# Patient Record
Sex: FEMALE | Race: BLACK OR AFRICAN AMERICAN | Employment: PART TIME | URBAN - METROPOLITAN AREA
[De-identification: names, ages, dates, MRNs, and addresses within clinical notes are randomized per-mention and may not be internally consistent; named-entity substitution may affect disease eponyms.]

---

## 2017-04-14 ENCOUNTER — TELEPHONE (OUTPATIENT)
Dept: FAMILY MEDICINE CLINIC | Facility: CLINIC | Age: 47
End: 2017-04-14

## 2017-04-14 NOTE — TELEPHONE ENCOUNTER
Attempted to contact patient Maxime Suarez regarding scheduling an appointment for continued care. Contact number on file is not working.

## 2017-08-03 ENCOUNTER — OFFICE VISIT (OUTPATIENT)
Dept: FAMILY MEDICINE CLINIC | Facility: CLINIC | Age: 47
End: 2017-08-03

## 2017-08-03 VITALS
WEIGHT: 265 LBS | HEART RATE: 77 BPM | DIASTOLIC BLOOD PRESSURE: 110 MMHG | TEMPERATURE: 98.2 F | SYSTOLIC BLOOD PRESSURE: 158 MMHG | OXYGEN SATURATION: 98 % | BODY MASS INDEX: 45.24 KG/M2 | HEIGHT: 64 IN

## 2017-08-03 DIAGNOSIS — R39.15 URINARY URGENCY: ICD-10-CM

## 2017-08-03 DIAGNOSIS — D86.9 SARCOIDOSIS: ICD-10-CM

## 2017-08-03 DIAGNOSIS — Z12.39 BREAST CANCER SCREENING: ICD-10-CM

## 2017-08-03 DIAGNOSIS — I10 HTN (HYPERTENSION), BENIGN: ICD-10-CM

## 2017-08-03 DIAGNOSIS — R82.90 ABNORMAL URINE ODOR: ICD-10-CM

## 2017-08-03 DIAGNOSIS — Z00.00 ROUTINE GENERAL MEDICAL EXAMINATION AT A HEALTH CARE FACILITY: Primary | ICD-10-CM

## 2017-08-03 DIAGNOSIS — E78.00 PURE HYPERCHOLESTEROLEMIA: ICD-10-CM

## 2017-08-03 DIAGNOSIS — E13.9 SECONDARY DIABETES (HCC): ICD-10-CM

## 2017-08-03 LAB
BILIRUB UR QL STRIP: NEGATIVE
GLUCOSE UR-MCNC: NEGATIVE MG/DL
KETONES P FAST UR STRIP-MCNC: NEGATIVE MG/DL
PH UR STRIP: 7 [PH] (ref 4.6–8)
PROT UR QL STRIP: NEGATIVE MG/DL
SP GR UR STRIP: 1.01 (ref 1–1.03)
UA UROBILINOGEN AMB POC: NORMAL (ref 0.2–1)
URINALYSIS CLARITY POC: CLEAR
URINALYSIS COLOR POC: YELLOW
URINE BLOOD POC: NEGATIVE
URINE LEUKOCYTES POC: NORMAL
URINE NITRITES POC: NEGATIVE

## 2017-08-03 RX ORDER — ONDANSETRON 4 MG/1
4 TABLET, ORALLY DISINTEGRATING ORAL
Qty: 90 TAB | Refills: 0 | Status: SHIPPED | OUTPATIENT
Start: 2017-08-03 | End: 2018-01-22 | Stop reason: SDUPTHER

## 2017-08-03 RX ORDER — VALSARTAN AND HYDROCHLOROTHIAZIDE 320; 12.5 MG/1; MG/1
1 TABLET, FILM COATED ORAL DAILY
Qty: 90 TAB | Refills: 1 | Status: SHIPPED | OUTPATIENT
Start: 2017-08-03 | End: 2019-10-24

## 2017-08-03 RX ORDER — PRAVASTATIN SODIUM 20 MG/1
20 TABLET ORAL
Qty: 90 TAB | Refills: 1 | Status: CANCELLED | OUTPATIENT
Start: 2017-08-03

## 2017-08-03 RX ORDER — VALSARTAN AND HYDROCHLOROTHIAZIDE 160; 12.5 MG/1; MG/1
1 TABLET, FILM COATED ORAL DAILY
Qty: 90 TAB | Refills: 1 | Status: CANCELLED | OUTPATIENT
Start: 2017-08-03

## 2017-08-03 NOTE — MR AVS SNAPSHOT
Visit Information Date & Time Provider Department Dept. Phone Encounter #  
 8/3/2017  9:30 AM Jesse Lopez [phone] [id number] Follow-up Instructions Return in about 4 weeks (around 8/31/2017) for routine care with Dr. Tim Schuster, for bp check, for med check. Your Appointments 8/3/2017  9:30 AM  
PHYSICAL with FRANDY Lopez 47 ([facility] Appt Note: CPE  syb 06/16; CPE syb 06/16  
 [address] [place] [address] [phone]  
[address] [address] Upcoming Health Maintenance Date Due HEMOGLOBIN A1C Q6M 2/4/2017 FOOT EXAM Q1 2/16/2017 MICROALBUMIN Q1 2/16/2017 INFLUENZA AGE 9 TO ADULT 8/1/2017 LIPID PANEL Q1 8/4/2017 EYE EXAM RETINAL OR DILATED Q1 10/2/2017 PAP AKA CERVICAL CYTOLOGY 12/8/2018 DTaP/Tdap/Td series (2 - Td) 5/18/2026 Allergies as of 8/3/2017  Review Complete On: 8/3/2017 By: Sinan Laura LPN Severity Noted Reaction Type Reactions Pork/porcine Containing Products  02/16/2016    Hives Current Immunizations  Reviewed on 12/31/2014 Name Date Influenza Vaccine 10/8/2013 Influenza Vaccine (Quad) PF 8/25/2015  8:18 AM  
 TB Skin Test (PPD) Intradermal 2/19/2016  4:00 PM, 12/31/2014 Tetanus Toxoid, Adsorbed 4/19/2012 Not reviewed this visit You Were Diagnosed With   
  
 Codes Comments Routine general medical examination at a health care facility    -  Primary ICD-10-CM: Z00.00 ICD-9-CM: V70.0   
 HTN (hypertension), benign     ICD-10-CM: I10 
ICD-9-CM: 401.1 Pure hypercholesterolemia     ICD-10-CM: E78.00 ICD-9-CM: 272.0 Secondary diabetes ([address]     ICD-10-CM: E13.9 ICD-9-CM: 249.00 Breast cancer screening     ICD-10-CM: Z12.39 
ICD-9-CM: V76.10 Vitals BP Pulse Temp Height(growth percentile) Weight(growth percentile) SpO2 (!) 160/110 (BP 1 Location: Left arm, BP Patient Position: Sitting) 77 98.2 °F (36.8 °C) (Oral) 5' 4\" (1.626 m) 265 lb (120.2 kg) 98% BMI OB Status Smoking Status 45.49 kg/m2 Hysterectomy Former Smoker BMI and BSA Data Body Mass Index Body Surface Area 45.49 kg/m 2 2.33 m 2 Preferred Pharmacy Pharmacy Name Phone Lisa [address] [place] [address] Giulia Starr Your Updated Medication List  
  
   
This list is accurate as of: 8/3/17  9:11 AM.  Always use your most recent med list. amLODIPine 10 mg tablet Commonly known as:  Ember Raddle Take 1 tablet by mouth daily. aspirin delayed-release 81 mg tablet Take  by mouth daily. azaTHIOprine 50 mg tablet Commonly known as:  [employer] Take 50 mg by mouth daily. 3 tab daily CALCIUM 600 + D 600-125 mg-unit Tab Generic drug:  calcium-cholecalciferol (d3) Take  by mouth.  
  
 etodolac 400 mg tablet Commonly known as:  LODINE Take  by mouth two (2) times daily (with meals). ferrous sulfate 325 mg (65 mg iron) tablet Take by Mouth Once a Day. IMITREX 100 mg tablet Generic drug:  SUMAtriptan Take 100 mg by mouth once as needed for Migraine. JANUVIA 100 mg tablet Generic drug:  SITagliptin Take 100 mg by mouth daily. ondansetron 4 mg disintegrating tablet Commonly known as:  ZOFRAN ODT Take 1 Tab by mouth every eight (8) hours as needed for Nausea. [address] Take  by mouth. PEPCID 20 mg tablet Generic drug:  famotidine Take 20 mg by mouth two (2) times a day. pneumococcal 23-valent 25 mcg/0.5 mL injection Commonly known as:  PNEUMOVAX 23  
0.5 mL by IntraMUSCular route once for 1 dose. pravastatin 20 mg tablet Commonly known as:  PRAVACHOL Take 1 Tab by mouth nightly. predniSONE 5 mg tablet Commonly known as:  Rowan Look  
 Take  by mouth. PULMICORT FLEXHALER 180 mcg/actuation Aepb inhaler Generic drug:  budesonide Take  by inhalation. solifenacin 10 mg tablet Commonly known as:  VESIcare Take 1 Tab by mouth daily. topiramate 50 mg tablet Commonly known as:  TOPAMAX Take 50 mg by mouth two (2) times a day. 100 mg at hs  
  
 valsartan-hydroCHLOROthiazide 320-12.5 mg per tablet Commonly known as:  DIOVAN-HCT Take 1 Tab by mouth daily. Prescriptions Printed Refills  
 pneumococcal 23-valent (PNEUMOVAX 23) 25 mcg/0.5 mL injection 0 Si.5 mL by IntraMUSCular route once for 1 dose. Class: Print Route: IntraMUSCular Prescriptions Sent to Pharmacy Refills  
 ondansetron (ZOFRAN ODT) 4 mg disintegrating tablet 0 Sig: Take 1 Tab by mouth every eight (8) hours as needed for Nausea. Class: Normal  
 Pharmacy: [facility] [address] [place] [address] Ph #: [phone] Route: Oral  
 valsartan-hydroCHLOROthiazide (DIOVAN-HCT) 320-12.5 mg per tablet 1 Sig: Take 1 Tab by mouth daily. Class: Normal  
 Pharmacy: [facility] [address] [place] [address] Ph #: [phone] Route: Oral  
  
Follow-up Instructions Return in about 4 weeks (around 2017) for routine care with Dr. Gallito Burns, for bp check, for med check. To-Do List   
 2017 Lab:  MICROALBUMIN, UR, RAND W/ MICROALBUMIN/CREA RATIO   
  
 2017 Lab:  CBC WITH AUTOMATED DIFF   
  
 2017 Lab:  HEMOGLOBIN A1C WITH EAG   
  
 2017 Lab:  LIPID PANEL   
  
 2017 Imaging:  ABIGAIL MAMMO BI SCREENING INCL CAD   
  
 2017 Lab:  METABOLIC PANEL, COMPREHENSIVE   
  
 2017 Lab:  T4, FREE   
  
 2017 Lab:  TSH 3RD GENERATION   
  
 2017 Lab:  VITAMIN D, 25 HYDROXY Introducing [facility]! Abel James introduces [employer] patient portal. Now you can access parts of your medical record, email your doctor's office, and request medication refills online. 1. In your internet browser, go to [email or web address] 2. Click on the First Time User? Click Here link in the Sign In box. You will see the New Member Sign Up page. 3. Enter your [employer] Access Code exactly as it appears below. You will not need to use this code after youve completed the sign-up process. If you do not sign up before the expiration date, you must request a new code. · [employer] Access Code: [email or web address] Expires: 11/1/2017  8:34 AM 
 
4. Enter the last four digits of your Social Security Number (xxxx) and Date of Birth (mm/dd/yyyy) as indicated and click Submit. You will be taken to the next sign-up page. 5. Create a [employer] ID. This will be your [employer] login ID and cannot be changed, so think of one that is secure and easy to remember. 6. Create a [employer] password. You can change your password at any time. 7. Enter your Password Reset Question and Answer. This can be used at a later time if you forget your password. 8. Enter your e-mail address. You will receive e-mail notification when new information is available in [address] 9. Click Sign Up. You can now view and download portions of your medical record. 10. Click the Download Summary menu link to download a portable copy of your medical information. If you have questions, please visit the Frequently Asked Questions section of the [employer] website. Remember, [employer] is NOT to be used for urgent needs. For medical emergencies, dial 911. Now available from your iPhone and Android! Please provide this summary of care documentation to your next provider. If you have any questions after today's visit, please call [phone].

## 2017-08-03 NOTE — PROGRESS NOTES
History and Physical    Patient: Raymon Pena MRN: [id number]  SSN: [id number]    YOB: 1970  Age: 55 y.o. Sex: female      Subjective:      Raymon Pena is a 55 y.o. female who presents today for an annual physical and follow up HTN, DM, sarcoidosis etc  Patient moved to [place] but moved back into the area in February. In terms of her HTN, she is on Norvasc and Diovan-HCT. BP elevated today, patient states took her meds today. In terms of her DM, she is on Januvia. She has an endocrinologist.  Sees on an annual basis. Is trying to get another appointment. In terms of her sarcoidosis, she is on Prednisone, pulmicort and azathioprine. She has a pulmonologist but she has not them in over 1 year, is trying to get another appointment. She will have occasional CP and SOB that is not associated with activity and is aleviated when she uses her inhaler. Patient went to the ED several months ago for CP and SOB, she stated she was off all of her medication at that time. Stress testing done was normal.     Patient has several year c/o urinary urgency and foul urine odor, she states that is partly why she is on vesicare. She has chronic, idiopathic nausea, requesting refill of zofran. She states her doctors think it may stem from her sarcoidosis.        Last Mammogram:  Been several years- ordered today  Last PAP:  Normal 7/2011- s/pt hyst- cervix removed      PMH:  Past Medical History:   Diagnosis Date    Acute sinusitis 12/8/2015    Anemia     Chronic pain     Diabetes ([address]     Headache     HTN (hypertension) 8/25/2015    HTN (hypertension), benign 8/25/2015    Hypertension     Obesity, Class II, BMI 35-39.9 2/16/2016    Obesity, Class III, BMI 40-49.9 (morbid obesity) ([address] 8/25/2015    Pure hypercholesterolemia 8/4/2016    Secondary diabetes ([address] 12/8/2015    Viral pharyngitis 2/19/2016    Vitamin D deficiency 2/16/2016     Past Surgical History:   Procedure Laterality Date    HX HYSTERECTOMY  10/20/2013    HX ORTHOPAEDIC  5/20/2012    left foot fusion        FamHx:  Family History   Problem Relation Age of Onset    Asthma Mother     Heart Disease Father      arhythmia    Stroke Father 48     TIA    Alcohol abuse Father     Heart Disease Paternal Grandmother        SocialHx:  Social History   Substance Use Topics    Smoking status: Former Smoker     Packs/day: 0.50     Years: 10.00     Quit date: 1/1/1986    Smokeless tobacco: Never Used      Comment: 1 pack every week    Alcohol use 8.4 oz/week     14 Cans of beer per week      Comment: OCC        Meds:  Prior to Admission medications    Medication Sig Start Date End Date Taking? Authorizing Provider   ondansetron (ZOFRAN ODT) 4 mg disintegrating tablet Take 1 Tab by mouth every eight (8) hours as needed for Nausea. 8/3/17  Yes Jessica Pumphrey V, PA   pneumococcal 23-valent (PNEUMOVAX 23) 25 mcg/0.5 mL injection 0.5 mL by IntraMUSCular route once for 1 dose. 8/3/17 8/3/17 Yes Jessica Pumphrey V, PA   valsartan-hydroCHLOROthiazide (DIOVAN-HCT) 320-12.5 mg per tablet Take 1 Tab by mouth daily. 8/3/17  Yes Jessica Pumphrey V, PA   ferrous sulfate 325 mg (65 mg iron) tablet Take by Mouth Once a Day. Yes Historical Provider   pravastatin (PRAVACHOL) 20 mg tablet Take 1 Tab by mouth nightly. 8/27/15  Yes Sintia Baird MD   MV,CA,MIN/IRON/FA/GUARANA/CAFF (ONE-A-DAY WOMEN'S ACTIVE PO) Take  by mouth. Yes Historical Provider   calcium-cholecalciferol, d3, (CALCIUM 600 + D) 600-125 mg-unit tab Take  by mouth. Yes Historical Provider   solifenacin (VESICARE) 10 mg tablet Take 1 Tab by mouth daily. 8/25/15  Yes Sintia Baird MD   famotidine (PEPCID) 20 mg tablet Take 20 mg by mouth two (2) times a day. Yes Historical Provider   etodolac (LODINE) 400 mg tablet Take  by mouth two (2) times daily (with meals). Yes Historical Provider   prednisone (DELTASONE) 5 mg tablet Take  by mouth.    Yes Historical Provider   sitagliptin (JANUVIA) 100 mg tablet Take 100 mg by mouth daily. Yes Historical Provider   topiramate (TOPAMAX) 50 mg tablet Take 50 mg by mouth two (2) times a day. 100 mg at hs   Yes Historical Provider   azathioprine (IMURAN) 50 mg tablet Take 50 mg by mouth daily. 3 tab daily   Yes Historical Provider   SUMAtriptan (IMITREX) 100 mg tablet Take 100 mg by mouth once as needed for Migraine. Yes Historical Provider   aspirin delayed-release 81 mg tablet Take  by mouth daily. Yes Historical Provider   budesonide (PULMICORT FLEXHALER) 180 mcg/actuation aepb inhaler Take  by inhalation. Yes Historical Provider   amlodipine (NORVASC) 10 mg tablet Take 1 tablet by mouth daily. 8/25/14  Yes Tj Harrington MD        Allergies: Allergies   Allergen Reactions    Pork/Porcine Containing Products Hives       Review of Systems:  Items in Bold are positive  Constitutional: negative for fevers, chills and malaise  Eyes: negative for visual disturbance  Ears, Nose, Mouth, Throat, and Face: negative for nasal congestion  Respiratory: intermittent SOB , negative for cough   Cardiovascular: intermittent CP, negative for chest pressure/discomfort  Gastrointestinal: negative for nausea, vomiting, melena, diarrhea, constipation and abdominal pain  Genitourinary: urinary urgency and odor, negative for frequency, dysuria or hematuria  Musculoskeletal:negative for myalgias and arthralgias  Neurological: negative for headaches, dizziness and paresthesia    Objective:     Visit Vitals    BP (!) 158/110 (BP 1 Location: Right arm, BP Patient Position: Sitting)    Pulse 77    Temp 98.2 °F (36.8 °C) (Oral)    Ht 5' 4\" (1.626 m)    Wt 265 lb (120.2 kg)    SpO2 98%    BMI 45.49 kg/m2       Physical Exam:  GENERAL: alert, cooperative, no distress, appears stated age  [de-identified]: EYE: conjunctivae/corneas clear. EOM's intact.  EAR: TM's pearly gray bilaterally NOSE: Nasal mucosa pink and moist bilaterally, THROAT: no erythema or edema  THYROID: no thyromegaly  NECK: no adenopathy  LUNG: clear to auscultation bilaterally  HEART: regular rate and rhythm, S1, S2 normal, no murmur, click, rub or gallop  ABDOMEN: soft, non-tender. Bowel sounds normal. No masses  DM FOOT EXAM: DP 1+ bilaterally, no pedal ulcerations or breaks in skin visualized, monofilament testing intact on left, mildly diminished on right, vibratory mildly impaired bialterally  EXTREMITIES:  extremities normal, atraumatic, no cyanosis or edema  NEUROLOGIC: AOx3. Gait normal.    Labs  Results for orders placed or performed in visit on 08/03/17   AMB POC URINALYSIS DIP STICK AUTO W/ MICRO     Status: None   Result Value Ref Range Status    Color (UA POC) Yellow  Final    Clarity (UA POC) Clear  Final    Glucose (UA POC) Negative Negative Final    Bilirubin (UA POC) Negative Negative Final    Ketones (UA POC) Negative Negative Final    Specific gravity (UA POC) 1.015 1.001 - 1.035 Final    Blood (UA POC) Negative Negative Final    pH (UA POC) 7.0 4.6 - 8.0 Final    Protein (UA POC) Negative Negative mg/dL Final    Urobilinogen (UA POC) 0.2 mg/dL 0.2 - 1 Final    Nitrites (UA POC) Negative Negative Final    Leukocyte esterase (UA POC) Trace Negative Final         Assessment and Plan:       ICD-10-CM ICD-9-CM    1. Routine general medical examination at a health care facility Z00.00 V70.0 ondansetron (ZOFRAN ODT) 4 mg disintegrating tablet      CBC WITH AUTOMATED DIFF      METABOLIC PANEL, COMPREHENSIVE      HEMOGLOBIN A1C WITH EAG      LIPID PANEL      VITAMIN D, 25 HYDROXY      TSH 3RD GENERATION      T4, FREE      CBC WITH AUTOMATED DIFF      METABOLIC PANEL, COMPREHENSIVE      HEMOGLOBIN A1C WITH EAG      LIPID PANEL      VITAMIN D, 25 HYDROXY      TSH 3RD GENERATION      T4, FREE   2. HTN (hypertension), benign I10 401.1 valsartan-hydroCHLOROthiazide (DIOVAN-HCT) 320-12.5 mg per tablet   3. Pure hypercholesterolemia E78.00 272.0    4.  Secondary diabetes ([address] E13.9 249.00 MICROALBUMIN, UR, RAND W/ MICROALBUMIN/CREA RATIO      pneumococcal 23-valent (PNEUMOVAX 23) 25 mcg/0.5 mL injection      MICROALBUMIN, UR, RAND W/ MICROALBUMIN/CREA RATIO   5. Sarcoidosis ([address] D86.9 135    6. Urinary urgency R39.15 788.63 AMB POC URINALYSIS DIP STICK AUTO W/ MICRO   7. Abnormal urine odor R82.90 791.9 AMB POC URINALYSIS DIP STICK AUTO W/ MICRO   8. Breast cancer screening Z12.39 V76.10 ABIGAIL MAMMO BI SCREENING INCL CAD         Medical Decision Making:  Physical- labs    HTN- increase Diovan-HCT, return in 2-4 weeks for BP recheck    HLD- labs- needs follow up    DM- labs- needs follow up    Sarcoidosis- patient to schedule follow up with pulmonology    Abnormal urine odor/urgency- urine culture, nothing concerning on POC    Follow-up Disposition:  Return in about 4 weeks (around 8/31/2017) for routine care with Dr. Remington Soni, for bp check, for med check. Patient acknowledges understanding of instructions and acknowledges understanding to call back if current symptoms worsen or new symptoms arise. Patient acknowledges and agrees with plan.         Signed By: FRANDY Khan     August 3, 2017

## 2017-08-03 NOTE — PROGRESS NOTES
Joe Mendez is a 55 y.o.  female presents today for office visit for follow up. Pt is in Room # 9      1. Have you been to the ER, urgent care clinic since your last visit? Hospitalized since your last visit? May Fabienne killian chest pain     2. Have you seen or consulted any other health care providers outside of the [address] [place] [address] since your last visit? Include any pap smears or colon screening. No    No Patient Care Coordination Note on file.

## 2017-08-05 LAB
25(OH)D3 SERPL-MCNC: 21.8 NG/ML (ref 32–100)
A-G RATIO,AGRAT: 1.6 RATIO (ref 1.1–2.6)
ABSOLUTE LYMPHOCYTE COUNT, 10803: 2.5 K/UL (ref 1–4.8)
ALBUMIN SERPL-MCNC: 4.1 G/DL (ref 3.5–5)
ALP SERPL-CCNC: 103 U/L (ref 25–115)
ALT SERPL-CCNC: 18 U/L (ref 5–40)
ANION GAP SERPL CALC-SCNC: 16 MMOL/L
AST SERPL W P-5'-P-CCNC: 21 U/L (ref 10–37)
AVG GLU, 10930: 136 MG/DL (ref 91–123)
BASOPHILS # BLD: 0 K/UL (ref 0–0.2)
BASOPHILS NFR BLD: 0 % (ref 0–2)
BILIRUB SERPL-MCNC: 0.5 MG/DL (ref 0.2–1.2)
BUN SERPL-MCNC: 9 MG/DL (ref 6–22)
CALCIUM SERPL-MCNC: 9 MG/DL (ref 8.4–10.5)
CHLORIDE SERPL-SCNC: 96 MMOL/L (ref 98–110)
CHOLEST SERPL-MCNC: 208 MG/DL (ref 110–200)
CO2 SERPL-SCNC: 24 MMOL/L (ref 20–32)
CREAT SERPL-MCNC: 0.7 MG/DL (ref 0.5–1.2)
CREATININE, URINE: 49 MG/DL
CULTURE RESULT, SENTARA: ABNORMAL
EOSINOPHIL # BLD: 0.4 K/UL (ref 0–0.5)
EOSINOPHIL NFR BLD: 6 % (ref 0–6)
ERYTHROCYTE [DISTWIDTH] IN BLOOD BY AUTOMATED COUNT: 13.9 % (ref 10–16)
GFRAA, 66117: >60
GFRNA, 66118: >60
GLOBULIN,GLOB: 2.6 G/DL (ref 2–4)
GLUCOSE SERPL-MCNC: 113 MG/DL (ref 65–99)
GRANULOCYTES,GRANS: 58 % (ref 40–75)
HBA1C MFR BLD HPLC: 6.4 % (ref 4.8–5.9)
HCT VFR BLD AUTO: 42.3 % (ref 35.1–48)
HDLC SERPL-MCNC: 53 MG/DL (ref 40–59)
HGB BLD-MCNC: 13.3 G/DL (ref 11.7–16)
LDLC SERPL CALC-MCNC: 136 MG/DL (ref 50–99)
LYMPHOCYTES, LYMLT: 33 % (ref 27–45)
MCH RBC QN AUTO: 27 PG (ref 26–34)
MCHC RBC AUTO-ENTMCNC: 31 G/DL (ref 32–36)
MCV RBC AUTO: 86 FL (ref 80–95)
MICROALB/CREAT RATIO, 140286: NORMAL MCG/MG OF CREATININE (ref 0–30)
MICROALBUMIN,URINE RANDOM 140054: <12 UG/ML (ref 0.1–17)
MONOCYTES # BLD: 0.2 K/UL (ref 0.1–0.9)
MONOCYTES NFR BLD: 3 % (ref 3–9)
NEUTROPHILS # BLD AUTO: 4.5 K/UL (ref 1.8–7.7)
PLATELET # BLD AUTO: 256 K/UL (ref 140–440)
PMV BLD AUTO: 10 FL (ref 6–10.8)
POTASSIUM SERPL-SCNC: 3.6 MMOL/L (ref 3.5–5.5)
PROT SERPL-MCNC: 6.7 G/DL (ref 6.4–8.3)
RBC # BLD AUTO: 4.94 M/UL (ref 3.8–5.2)
SODIUM SERPL-SCNC: 136 MMOL/L (ref 133–145)
T4 FREE SERPL-MCNC: 1.1 NG/DL (ref 0.9–1.8)
TRIGL SERPL-MCNC: 91 MG/DL (ref 40–149)
TSH SERPL DL<=0.005 MIU/L-ACNC: 1.31 MCU/ML (ref 0.27–4.2)
VLDLC SERPL CALC-MCNC: 18 MG/DL (ref 8–30)
WBC # BLD AUTO: 7.7 K/UL (ref 4–11)

## 2017-08-08 ENCOUNTER — TELEPHONE (OUTPATIENT)
Dept: FAMILY MEDICINE CLINIC | Facility: CLINIC | Age: 47
End: 2017-08-08

## 2017-08-08 RX ORDER — CIPROFLOXACIN 250 MG/1
250 TABLET, FILM COATED ORAL EVERY 12 HOURS
Qty: 6 TAB | Refills: 0 | Status: SHIPPED | OUTPATIENT
Start: 2017-08-08 | End: 2017-08-11

## 2017-08-08 NOTE — TELEPHONE ENCOUNTER
Please advise her urine shows a UTI, will call in [employer], sent to pharmacy    Please advise her diabetes is well controlled, continue current therapy    Her cholesterol is slightly elevated, continue pravachol but stick to low fat diet and exercise 150 minutes weekly    Her vitamin D is low, recommend taking total dose of vitamin D 5000 intl units from all sources, per med list, she may already be taking 1000 intl units with calcium

## 2017-08-23 ENCOUNTER — OFFICE VISIT (OUTPATIENT)
Dept: FAMILY MEDICINE CLINIC | Facility: CLINIC | Age: 47
End: 2017-08-23

## 2017-08-23 VITALS
HEIGHT: 64 IN | DIASTOLIC BLOOD PRESSURE: 80 MMHG | TEMPERATURE: 97 F | BODY MASS INDEX: 44.73 KG/M2 | HEART RATE: 80 BPM | OXYGEN SATURATION: 95 % | RESPIRATION RATE: 16 BRPM | WEIGHT: 262 LBS | SYSTOLIC BLOOD PRESSURE: 130 MMHG

## 2017-08-23 DIAGNOSIS — I10 HTN (HYPERTENSION), BENIGN: ICD-10-CM

## 2017-08-23 DIAGNOSIS — E66.01 OBESITY, CLASS III, BMI 40-49.9 (MORBID OBESITY) (HCC): ICD-10-CM

## 2017-08-23 DIAGNOSIS — E78.00 PURE HYPERCHOLESTEROLEMIA: ICD-10-CM

## 2017-08-23 DIAGNOSIS — E13.9 SECONDARY DIABETES (HCC): Primary | ICD-10-CM

## 2017-08-23 DIAGNOSIS — Z23 ENCOUNTER FOR IMMUNIZATION: ICD-10-CM

## 2017-08-23 DIAGNOSIS — Z13.31 SCREENING FOR DEPRESSION: ICD-10-CM

## 2017-08-23 RX ORDER — SOLIFENACIN SUCCINATE 10 MG/1
10 TABLET, FILM COATED ORAL DAILY
Qty: 90 TAB | Refills: 3 | Status: SHIPPED | OUTPATIENT
Start: 2017-08-23 | End: 2019-09-19

## 2017-08-23 RX ORDER — PRAVASTATIN SODIUM 40 MG/1
40 TABLET ORAL
Qty: 90 TAB | Refills: 3 | Status: SHIPPED | OUTPATIENT
Start: 2017-08-23 | End: 2017-12-12

## 2017-08-23 NOTE — PROGRESS NOTES
Internal Medicine Progress Note    Today's Date:  2017   Patient:  Breonna Vazquez  Patient :  1970    Subjective:     Chief Complaint   Patient presents with    Hypertension    Cholesterol Problem    Diabetes    Immunization/Injection      Sarcoidosis  This is a chronic problem. This is stable. Pt takes imuran, prednisone and pulmicort. Pt sees a rheumatologist and pulmonologist.  Last eye exam was 16. Pneumonia vaccine is up to date. Secondary diabetes  This is a chronic problem. BS is at goal. Pt is on [place] and [place]. She is compliant with her medication. Hypertension   This is a chronic problem. BP is at goal. Pt takes norvasc and valsartan/HCTZ. Pt reports compliance with these medications. Past Medical History:   Diagnosis Date    Acute sinusitis 2015    Anemia     Chronic pain     Diabetes ([address]     Headache     HTN (hypertension) 2015    HTN (hypertension), benign 2015    Hypertension     Obesity, Class II, BMI 35-39.9 2016    Obesity, Class III, BMI 40-49.9 (morbid obesity) ([address] 2015    Pure hypercholesterolemia 2016    Secondary diabetes ([address] 2015    Viral pharyngitis 2016    Vitamin D deficiency 2016     Past Surgical History:   Procedure Laterality Date    HX HYSTERECTOMY  10/20/2013    HX ORTHOPAEDIC  2012    left foot fusion      reports that she quit smoking about 31 years ago. She has a 5.00 pack-year smoking history. She has never used smokeless tobacco. She reports that she drinks about 8.4 oz of alcohol per week  She reports that she does not use illicit drugs.   Family History   Problem Relation Age of Onset    Asthma Mother     Heart Disease Father      arhythmia    Stroke Father 48     TIA    Alcohol abuse Father     Heart Disease Paternal Grandmother      Allergies   Allergen Reactions    Pork/Porcine Containing Products Hives     Review of Systems   Positives in bold  CV:      chest pain, palpitations  PULM:  SOB, wheezing, cough, sputum production    Current Outpatient Meds and Allergies     Current Outpatient Prescriptions on File Prior to Visit   Medication Sig Dispense Refill    ondansetron (ZOFRAN ODT) 4 mg disintegrating tablet Take 1 Tab by mouth every eight (8) hours as needed for Nausea. 90 Tab 0    valsartan-hydroCHLOROthiazide (DIOVAN-HCT) 320-12.5 mg per tablet Take 1 Tab by mouth daily. 90 Tab 1    ferrous sulfate 325 mg (65 mg iron) tablet Take by Mouth Once a Day.  MV,CA,MIN/IRON/FA/GUARANA/CAFF (ONE-A-DAY WOMEN'S ACTIVE PO) Take  by mouth.  calcium-cholecalciferol, d3, (CALCIUM 600 + D) 600-125 mg-unit tab Take  by mouth.  famotidine (PEPCID) 20 mg tablet Take 20 mg by mouth two (2) times a day.  etodolac (LODINE) 400 mg tablet Take  by mouth two (2) times daily (with meals).  topiramate (TOPAMAX) 50 mg tablet Take 50 mg by mouth two (2) times a day. 100 mg at hs      azathioprine (IMURAN) 50 mg tablet Take 50 mg by mouth daily. 3 tab daily      SUMAtriptan (IMITREX) 100 mg tablet Take 100 mg by mouth once as needed for Migraine.  aspirin delayed-release 81 mg tablet Take  by mouth daily.  budesonide (PULMICORT FLEXHALER) 180 mcg/actuation aepb inhaler Take  by inhalation.  amlodipine (NORVASC) 10 mg tablet Take 1 tablet by mouth daily. 90 tablet 1     No current facility-administered medications on file prior to visit.       Allergies   Allergen Reactions    Pork/Porcine Containing Products Hives     Objective:     VS:    Visit Vitals    /80 (BP 1 Location: Right arm, BP Patient Position: Sitting)  Comment: right arm manual    Pulse 80    Temp 97 °F (36.1 °C) (Oral)    Resp 16    Ht 5' 4\" (1.626 m)    Wt 262 lb (118.8 kg)    SpO2 95%    BMI 44.97 kg/m2     General:   Well-nourished, well-groomed, pleasant, alert, in no acute distress  Head:  Normocephalic, atraumatic  Ears:  External ears WNL  Nose:  External nares WNL  Psych:  No pressured speech, no abnormal thought content    PHQ over the last two weeks 8/23/2017   Little interest or pleasure in doing things Not at all   Feeling down, depressed or hopeless Not at all   Total Score PHQ 2 0     Office Visit on 08/03/2017   Component Date Value Ref Range Status    WBC 08/03/2017 7.7  4.0 - 11.0 K/uL Final    RBC 08/03/2017 4.94  3.80 - 5.20 M/uL Final    HGB 08/03/2017 13.3  11.7 - 16.0 g/dL Final    HCT 08/03/2017 42.3  35.1 - 48.0 % Final    MCV 08/03/2017 86  80 - 95 fL Final    MCH 08/03/2017 27  26 - 34 pg Final    MCHC 08/03/2017 31* 32 - 36 g/dL Final    RDW 08/03/2017 13.9  10.0 - 16.0 % Final    PLATELET 47/53/2049 565  140 - 440 K/uL Final    MPV 08/03/2017 10.0  6.0 - 10.8 fL Final    NEUTROPHILS 08/03/2017 58  40 - 75 % Final    Lymphocytes 08/03/2017 33  27 - 45 % Final    MONOCYTES 08/03/2017 3  3 - 9 % Final    EOSINOPHILS 08/03/2017 6  0 - 6 % Final    BASOPHILS 08/03/2017 0  0 - 2 % Final    ABS. NEUTROPHILS 08/03/2017 4.5  1.8 - 7.7 K/uL Final    ABSOLUTE LYMPHOCYTE COUNT 08/03/2017 2.5  1.0 - 4.8 K/uL Final    ABS. MONOCYTES 08/03/2017 0.2  0.1 - 0.9 K/uL Final    ABS. EOSINOPHILS 08/03/2017 0.4  0.0 - 0.5 K/uL Final    ABS. BASOPHILS 08/03/2017 0.0  0.0 - 0.2 K/uL Final    Glucose 08/03/2017 113* 65 - 99 mg/dL Final    BUN 08/03/2017 9  6 - 22 mg/dL Final    Creatinine 08/03/2017 0.7  0.5 - 1.2 mg/dL Final    Sodium 08/03/2017 136  133 - 145 mmol/L Final    Potassium 08/03/2017 3.6  3.5 - 5.5 mmol/L Final    Chloride 08/03/2017 96* 98 - 110 mmol/L Final    CO2 08/03/2017 24  20 - 32 mmol/L Final    AST (SGOT) 08/03/2017 21  10 - 37 U/L Final    ALT (SGPT) 08/03/2017 18  5 - 40 U/L Final    Alk.  phosphatase 08/03/2017 103  25 - 115 U/L Final    Bilirubin, total 08/03/2017 0.5  0.2 - 1.2 mg/dL Final    Calcium 08/03/2017 9.0  8.4 - 10.5 mg/dL Final    Protein, total 08/03/2017 6.7  6.4 - 8.3 g/dL Final    Albumin 08/03/2017 4.1  3.5 - 5.0 g/dL Final    A-G Ratio 08/03/2017 1.6  1.1 - 2.6 ratio Final    Globulin 08/03/2017 2.6  2.0 - 4.0 g/dL Final    Anion gap 08/03/2017 16.0  mmol/L Final    Comment: Test includes Albumin, Alkaline Phosphatase, ALT, AST, BUN, Calcium, CO2,  Chloride, Creatinine, Glucose, Potassium, Sodium, Total Bilirubin and Total  Protein. Estimated GFR results are reported in mL/min/1.73 sq.m. by the MDRD equation. This eGFR is validated for stable chronic renal failure patients. This   equation  is unreliable in acute illness or patients with normal renal function.  GFRAA 08/03/2017 >60.0  >60.0 Final    GFRNA 08/03/2017 >60.0  >60.0 Final    Triglyceride 08/03/2017 91  40 - 149 mg/dL Final    HDL Cholesterol 08/03/2017 53  40 - 59 mg/dL Final    Cholesterol, total 08/03/2017 208* 110 - 200 mg/dL Final    LDL, calculated 08/03/2017 136* 50 - 99 mg/dL Final    VLDL, calculated 08/03/2017 18  8 - 30 mg/dL Final    Comment: Test includes cholesterol, HDL cholesterol, triglycerides and LDL.   Cholesterol Recommended NCEP guidelines in mg/dL:  Less than 200      Desirable  200 - 239          Borderline High  Greater than or  = to 240   High      VITAMIN D, 25-HYDROXY 08/03/2017 21.8* 32.0 - 100.0 ng/mL Final    TSH 08/03/2017 1.31  0.27 - 4.20 mcU/mL Final    T4, Free 08/03/2017 1.1  0.9 - 1.8 ng/dL Final    Creatinine, urine 08/03/2017 49  mg/dL Final    Microalbumin, urine 08/03/2017 <12.0  0.1 - 17.0 ug/mL Final    Microalb/Creat ratio (ug/mg creat.) 08/03/2017   0.0 - 30.0 mcg/mg of Creatinine Final    ** Unable to calculate Microablumin/Creatinine Ratio due to low Microalbumin    Color (UA POC) 08/03/2017 Yellow   Final    Clarity (UA POC) 08/03/2017 Clear   Final    Glucose (UA POC) 08/03/2017 Negative  Negative Final    Bilirubin (UA POC) 08/03/2017 Negative  Negative Final    Ketones (UA POC) 08/03/2017 Negative  Negative Final    Specific gravity (UA POC) 08/03/2017 1.015  1.001 - 1.035 Final    Blood (UA POC) 08/03/2017 Negative  Negative Final    pH (UA POC) 08/03/2017 7.0  4.6 - 8.0 Final    Protein (UA POC) 08/03/2017 Negative  Negative mg/dL Final    Urobilinogen (UA POC) 08/03/2017 0.2 mg/dL  0.2 - 1 Final    Nitrites (UA POC) 08/03/2017 Negative  Negative Final    Leukocyte esterase (UA POC) 08/03/2017 Trace  Negative Final    CULTURE RESULT 08/03/2017 ABNORMAL*  Final    Comment: Updated: [id number]  LAB ACC#: [id number]  SOURCE: Clean Catch Urine  --FINAL REPORT--  Greater than 100,000 ORG/ML Escherichia coli  --SUSCEPTIBILITY REPORT--                 ESCHERICHIA COLI                                                        INTERP  Amikacin                                                   S  Ampicillin                                                 R  Ampicillin/Sulbactam                                       R  Aztreonam                                                  S  Cefazolin                                                  S  Cefepime                                                   S  Ceftriaxone                                                S  Ciprofloxacin                                              S  Gentamicin                                                 S  Meropenem                                                  S  Nitrofurantoin                                             S  Tobramycin                                                                            S  Trimethoprim/Sulfamethoxazole                              S      Hemoglobin A1c 08/03/2017 6.4* 4.8 - 5.9 % Final    AVG GLU 08/03/2017 136* 91 - 123 mg/dL Final     Assessment/Plan & Orders:         ICD-10-CM ICD-9-CM    1. Secondary diabetes ([facility] E13.9 249.00 pravastatin (PRAVACHOL) 40 mg tablet   2. HTN (hypertension), benign I10 401.1    3. Obesity, Class III, BMI 40-49.9 (morbid obesity) (HCC) E66.01 278.01    4.  Pure hypercholesterolemia E78.00 272.0 pravastatin (PRAVACHOL) 40 mg tablet   5. Encounter for immunization Z23 V03.89 INFLUENZA VIRUS VAC QUAD,SPLIT,PRESV FREE SYRINGE 3/> YRS IM   6. Screening for depression Z13.89 V79.0 [place] DEPRESSION [address]      Healthy lifestyle has been encouraged including avoidance of tobacco, limiting or avoiding alcohol intake, heart healthy diet which is low in cholesterol and saturated fat and contains fresh fruits, vegetables and whole grains and fiber, regular exercise with goals of 20-30 minutes 3-5 days weekly and maintaining an optimal BMI. Follow up with rheumatology and pulmonary    Follow-up Disposition:  Return in about 3 months (around 11/23/2017) for Follow up hypertension, Follow up hyperlipidemia. *Patient verbalized understanding and agreement with the plan. Patient was given an after-visit summary. Magdalena Delacruz.  [address] - Internal Medicine  11/29/2017, 8:00 AM  [facility]  [address] [place], [address]  Phone [phone]  Fax [phone]

## 2017-08-23 NOTE — MR AVS SNAPSHOT
Visit Information Date & Time Provider Department Dept. Phone Encounter #  
 8/23/2017  2:30 PM Tonya Silver MD [facility] [phone] [id number] Follow-up Instructions Return in about 3 months (around 11/23/2017) for Follow up hypertension, Follow up hyperlipidemia. Upcoming Health Maintenance Date Due INFLUENZA AGE 9 TO ADULT 8/1/2017 EYE EXAM RETINAL OR DILATED Q1 10/2/2017 HEMOGLOBIN A1C Q6M 2/3/2018 FOOT EXAM Q1 8/3/2018 MICROALBUMIN Q1 8/3/2018 LIPID PANEL Q1 8/3/2018 PAP AKA CERVICAL CYTOLOGY 12/8/2018 DTaP/Tdap/Td series (2 - Td) 5/18/2026 Allergies as of 8/23/2017  Review Complete On: 8/23/2017 By: Tonya Silver MD  
  
 Severity Noted Reaction Type Reactions Pork/porcine Containing Products  02/16/2016    Hives Current Immunizations  Reviewed on 12/31/2014 Name Date Influenza Vaccine 10/8/2013 Influenza Vaccine (Quad) PF 8/23/2017  2:24 PM, 8/25/2015  8:18 AM  
 TB Skin Test (PPD) Intradermal 2/19/2016  4:00 PM, 12/31/2014, 11/30/2007 12:00 AM  
 Tetanus Toxoid, Adsorbed 4/19/2012 Not reviewed this visit You Were Diagnosed With   
  
 Codes Comments Secondary diabetes ([facility]    -  Primary ICD-10-CM: E13.9 ICD-9-CM: 249.00 HTN (hypertension), benign     ICD-10-CM: I10 
ICD-9-CM: 401.1 Obesity, Class III, BMI 40-49.9 (morbid obesity) (HCC)     ICD-10-CM: E66.01 
ICD-9-CM: 278.01   
 Pure hypercholesterolemia     ICD-10-CM: E78.00 ICD-9-CM: 272.0 Encounter for immunization     ICD-10-CM: H27 ICD-9-CM: V03.89 Vitals BP Pulse Temp Resp Height(growth percentile) Weight(growth percentile) 130/80 (BP 1 Location: Right arm, BP Patient Position: Sitting) 80 97 °F (36.1 °C) (Oral) 16 5' 4\" (1.626 m) 262 lb (118.8 kg) SpO2 BMI OB Status Smoking Status 95% 44.97 kg/m2 Hysterectomy Former Smoker Vitals History BMI and BSA Data Body Mass Index Body Surface Area 44.97 kg/m 2 2.32 m 2 Preferred Pharmacy Pharmacy Name Phone [facility] Thomas [address] [place] [address] Giulia Starr Your Updated Medication List  
  
   
This list is accurate as of: 8/23/17  2:36 PM.  Always use your most recent med list. amLODIPine 10 mg tablet Commonly known as:  Malcolm Rings Take 1 tablet by mouth daily. aspirin delayed-release 81 mg tablet Take  by mouth daily. azaTHIOprine 50 mg tablet Commonly known as:  [employer] Take 50 mg by mouth daily. 3 tab daily CALCIUM 600 + D 600-125 mg-unit Tab Generic drug:  calcium-cholecalciferol (d3) Take  by mouth.  
  
 etodolac 400 mg tablet Commonly known as:  LODINE Take  by mouth two (2) times daily (with meals). ferrous sulfate 325 mg (65 mg iron) tablet Take by Mouth Once a Day. IMITREX 100 mg tablet Generic drug:  SUMAtriptan Take 100 mg by mouth once as needed for Migraine. JANUVIA 100 mg tablet Generic drug:  SITagliptin Take 100 mg by mouth daily. ondansetron 4 mg disintegrating tablet Commonly known as:  ZOFRAN ODT Take 1 Tab by mouth every eight (8) hours as needed for Nausea. [address] Take  by mouth. PEPCID 20 mg tablet Generic drug:  famotidine Take 20 mg by mouth two (2) times a day. pravastatin 40 mg tablet Commonly known as:  PRAVACHOL Take 1 Tab by mouth nightly. predniSONE 5 mg tablet Commonly known as:  Lisa Loth Take  by mouth. PULMICORT FLEXHALER 180 mcg/actuation Aepb inhaler Generic drug:  budesonide Take  by inhalation. solifenacin 10 mg tablet Commonly known as:  VESIcare Take 1 Tab by mouth daily. topiramate 50 mg tablet Commonly known as:  TOPAMAX Take 50 mg by mouth two (2) times a day.  100 mg at hs  
  
 valsartan-hydroCHLOROthiazide 320-12.5 mg per tablet Commonly known as:  DIOVAN-HCT Take 1 Tab by mouth daily. Prescriptions Sent to Pharmacy Refills  
 solifenacin (VESICARE) 10 mg tablet 3 Sig: Take 1 Tab by mouth daily. Class: Normal  
 Pharmacy: [facility] [address] [place] [address] Ph #: [phone] Route: Oral  
 pravastatin (PRAVACHOL) 40 mg tablet 3 Sig: Take 1 Tab by mouth nightly. Class: Normal  
 Pharmacy: [facility] [address] [place] [address] Ph #: [phone] Route: Oral  
  
We Performed the Following INFLUENZA VIRUS VAC QUAD,SPLIT,PRESV FREE SYRINGE 3/> YRS IM [id number] CPT(R)] Follow-up Instructions Return in about 3 months (around 11/23/2017) for Follow up hypertension, Follow up hyperlipidemia. Patient Instructions Vaccine Information Statement Influenza (Flu) Vaccine (Inactivated or Recombinant): What you need to know Many Vaccine Information Statements are available in Filipino and other languages. See www.immunize.org/vis Hojas de Información Sobre Vacunas están disponibles en Español y en muchos otros idiomas. Visite www.immunize.org/vis 1. Why get vaccinated? Influenza (flu) is a contagious disease that spreads around the [place] every year, usually between October and May. Flu is caused by influenza viruses, and is spread mainly by coughing, sneezing, and close contact. Anyone can get flu. Flu strikes suddenly and can last several days. Symptoms vary by age, but can include: 
 fever/chills  sore throat  muscle aches  fatigue  cough  headache  runny or stuffy nose Flu can also lead to pneumonia and blood infections, and cause diarrhea and seizures in children. If you have a medical condition, such as heart or lung disease, flu can make it worse. Flu is more dangerous for some people. Infants and young children, people 72years of age and older, pregnant women, and people with certain health conditions or a weakened immune system are at greatest risk. Each year thousands of people in the [facility] die from flu, and many more are hospitalized. Flu vaccine can: 
 keep you from getting flu, 
 make flu less severe if you do get it, and 
 keep you from spreading flu to your family and other people. 2. Inactivated and recombinant flu vaccines A dose of flu vaccine is recommended every flu season. Children 6 months through 6years of age may need two doses during the same flu season. Everyone else needs only one dose each flu season. Some inactivated flu vaccines contain a very small amount of a mercury-based preservative called thimerosal. Studies have not shown thimerosal in vaccines to be harmful, but flu vaccines that do not contain thimerosal are available. There is no live flu virus in flu shots. They cannot cause the flu. There are many flu viruses, and they are always changing. Each year a new flu vaccine is made to protect against three or four viruses that are likely to cause disease in the upcoming flu season. But even when the vaccine doesnt exactly match these viruses, it may still provide some protection Flu vaccine cannot prevent: 
 flu that is caused by a virus not covered by the vaccine, or 
 illnesses that look like flu but are not. It takes about 2 weeks for protection to develop after vaccination, and protection lasts through the flu season. 3. Some people should not get this vaccine Tell the person who is giving you the vaccine:  If you have any severe, life-threatening allergies.    
If you ever had a life-threatening allergic reaction after a dose of flu vaccine, or have a severe allergy to any part of this vaccine, you may be advised not to get vaccinated. Most, but not all, types of flu vaccine contain a small amount of egg protein.  If you ever had Guillain-Barré Syndrome (also called GBS). Some people with a history of GBS should not get this vaccine. This should be discussed with your doctor.  If you are not feeling well. It is usually okay to get flu vaccine when you have a mild illness, but you might be asked to come back when you feel better. 4. Risks of a vaccine reaction With any medicine, including vaccines, there is a chance of reactions. These are usually mild and go away on their own, but serious reactions are also possible. Most people who get a flu shot do not have any problems with it. Minor problems following a flu shot include:  
 soreness, redness, or swelling where the shot was given  hoarseness  sore, red or itchy eyes  cough  fever  aches  headache  itching  fatigue If these problems occur, they usually begin soon after the shot and last 1 or 2 days. More serious problems following a flu shot can include the following:  There may be a small increased risk of Guillain-Barré Syndrome (GBS) after inactivated flu vaccine. This risk has been estimated at 1 or 2 additional cases per million people vaccinated. This is much lower than the risk of severe complications from flu, which can be prevented by flu vaccine.  Young children who get the flu shot along with pneumococcal vaccine (PCV13) and/or DTaP vaccine at the same time might be slightly more likely to have a seizure caused by fever. Ask your doctor for more information. Tell your doctor if a child who is getting flu vaccine has ever had a seizure. Problems that could happen after any injected vaccine:  People sometimes faint after a medical procedure, including vaccination.  Sitting or lying down for about 15 minutes can help prevent fainting, and injuries caused by a fall. Tell your doctor if you feel dizzy, or have vision changes or ringing in the ears.  Some people get severe pain in the shoulder and have difficulty moving the arm where a shot was given. This happens very rarely.  Any medication can cause a severe allergic reaction. Such reactions from a vaccine are very rare, estimated at about 1 in a million doses, and would happen within a few minutes to a few hours after the vaccination. As with any medicine, there is a very remote chance of a vaccine causing a serious injury or death. The safety of vaccines is always being monitored. For more information, visit: www.cdc.gov/vaccinesafety/ 
 
 
The [facility] Vaccine Injury Compensation Program (VICP) is a federal program that was created to compensate people who may have been injured by certain vaccines.  
 
Persons who believe they may have been injured by a vaccine can learn about the program and about filing a claim by calling 7-774.546.3027 or visiting the [address] website at www.[facility].gov/vaccinecompensation. There is a time limit to file a claim for compensation. 7. How can I learn more?  Ask your healthcare provider. He or she can give you the vaccine package insert or suggest other sources of information.  Call your local or state health department.  Contact the Centers for Disease Control and Prevention (CDC): 
- Call 3-237.494.2360 (1-800-CDC-INFO) or 
- Visit CDCs website at www.cdc.gov/flu Vaccine Information Statement Inactivated Influenza Vaccine 8/7/2015 
[address] URoel Randhawa 202HP-79 Department of [facility] and [place] Centers for Disease Control and Prevention Office Use Only Introducing [facility]! [facility] introduces Health Benefits Direct patient portal. Now you can access parts of your medical record, email your doctor's office, and request medication refills online. 1. In your internet browser, go to [email or web address] 2. Click on the First Time User? Click Here link in the Sign In box. You will see the New Member Sign Up page. 3. Enter your Health Benefits Direct Access Code exactly as it appears below. You will not need to use this code after youve completed the sign-up process. If you do not sign up before the expiration date, you must request a new code. · Health Benefits Direct Access Code: [email or web address] Expires: 11/1/2017  8:34 AM 
 
4. Enter the last four digits of your Social Security Number (xxxx) and Date of Birth (mm/dd/yyyy) as indicated and click Submit. You will be taken to the next sign-up page. 5. Create a [employer] ID. This will be your Health Benefits Direct login ID and cannot be changed, so think of one that is secure and easy to remember. 6. Create a [employer] password. You can change your password at any time. 7. Enter your Password Reset Question and Answer. This can be used at a later time if you forget your password. 8. Enter your e-mail address.  You will receive e-mail notification when new information is available in [employer]. 9. Click Sign Up. You can now view and download portions of your medical record. 10. Click the Download Summary menu link to download a portable copy of your medical information. If you have questions, please visit the Frequently Asked Questions section of the [employer] website. Remember, [employer] is NOT to be used for urgent needs. For medical emergencies, dial 911. Now available from your iPhone and Android! Please provide this summary of care documentation to your next provider. Your primary care clinician is listed as Lissa Burgos. If you have any questions after today's visit, please call [phone].

## 2017-08-23 NOTE — PATIENT INSTRUCTIONS
Vaccine Information Statement    Influenza (Flu) Vaccine (Inactivated or Recombinant): What you need to know    Many Vaccine Information Statements are available in Kinyarwanda and other languages. See www.immunize.org/vis  Hojas de Información Sobre Vacunas están disponibles en Español y en muchos otros idiomas. Visite www.immunize.org/vis    1. Why get vaccinated? Influenza (flu) is a contagious disease that spreads around the [place] every year, usually between October and May. Flu is caused by influenza viruses, and is spread mainly by coughing, sneezing, and close contact. Anyone can get flu. Flu strikes suddenly and can last several days. Symptoms vary by age, but can include:   fever/chills   sore throat   muscle aches   fatigue   cough   headache    runny or stuffy nose    Flu can also lead to pneumonia and blood infections, and cause diarrhea and seizures in children. If you have a medical condition, such as heart or lung disease, flu can make it worse. Flu is more dangerous for some people. Infants and young children, people 72years of age and older, pregnant women, and people with certain health conditions or a weakened immune system are at greatest risk. Each year thousands of people in the [facility] die from flu, and many more are hospitalized. Flu vaccine can:   keep you from getting flu,   make flu less severe if you do get it, and   keep you from spreading flu to your family and other people. 2. Inactivated and recombinant flu vaccines    A dose of flu vaccine is recommended every flu season. Children 6 months through 6years of age may need two doses during the same flu season. Everyone else needs only one dose each flu season.        Some inactivated flu vaccines contain a very small amount of a mercury-based preservative called thimerosal. Studies have not shown thimerosal in vaccines to be harmful, but flu vaccines that do not contain thimerosal are available. There is no live flu virus in flu shots. They cannot cause the flu. There are many flu viruses, and they are always changing. Each year a new flu vaccine is made to protect against three or four viruses that are likely to cause disease in the upcoming flu season. But even when the vaccine doesnt exactly match these viruses, it may still provide some protection    Flu vaccine cannot prevent:   flu that is caused by a virus not covered by the vaccine, or   illnesses that look like flu but are not. It takes about 2 weeks for protection to develop after vaccination, and protection lasts through the flu season. 3. Some people should not get this vaccine    Tell the person who is giving you the vaccine:     If you have any severe, life-threatening allergies. If you ever had a life-threatening allergic reaction after a dose of flu vaccine, or have a severe allergy to any part of this vaccine, you may be advised not to get vaccinated. Most, but not all, types of flu vaccine contain a small amount of egg protein.  If you ever had Guillain-Barré Syndrome (also called GBS). Some people with a history of GBS should not get this vaccine. This should be discussed with your doctor.  If you are not feeling well. It is usually okay to get flu vaccine when you have a mild illness, but you might be asked to come back when you feel better. 4. Risks of a vaccine reaction    With any medicine, including vaccines, there is a chance of reactions. These are usually mild and go away on their own, but serious reactions are also possible. Most people who get a flu shot do not have any problems with it.      Minor problems following a flu shot include:    soreness, redness, or swelling where the shot was given     hoarseness   sore, red or itchy eyes   cough   fever   aches   headache   itching   fatigue  If these problems occur, they usually begin soon after the shot and last 1 or 2 days. More serious problems following a flu shot can include the following:     There may be a small increased risk of Guillain-Barré Syndrome (GBS) after inactivated flu vaccine. This risk has been estimated at 1 or 2 additional cases per million people vaccinated. This is much lower than the risk of severe complications from flu, which can be prevented by flu vaccine.  Young children who get the flu shot along with pneumococcal vaccine (PCV13) and/or DTaP vaccine at the same time might be slightly more likely to have a seizure caused by fever. Ask your doctor for more information. Tell your doctor if a child who is getting flu vaccine has ever had a seizure. Problems that could happen after any injected vaccine:      People sometimes faint after a medical procedure, including vaccination. Sitting or lying down for about 15 minutes can help prevent fainting, and injuries caused by a fall. Tell your doctor if you feel dizzy, or have vision changes or ringing in the ears.  Some people get severe pain in the shoulder and have difficulty moving the arm where a shot was given. This happens very rarely.  Any medication can cause a severe allergic reaction. Such reactions from a vaccine are very rare, estimated at about 1 in a million doses, and would happen within a few minutes to a few hours after the vaccination. As with any medicine, there is a very remote chance of a vaccine causing a serious injury or death. The safety of vaccines is always being monitored. For more information, visit: www.cdc.gov/vaccinesafety/    5. What if there is a serious reaction? What should I look for?  Look for anything that concerns you, such as signs of a severe allergic reaction, very high fever, or unusual behavior.     Signs of a severe allergic reaction can include hives, swelling of the face and throat, difficulty breathing, a fast heartbeat, dizziness, and weakness  usually within a few minutes to a few hours after the vaccination. What should I do?  If you think it is a severe allergic reaction or other emergency that cant wait, call 9-1-1 and get the person to the nearest hospital. Otherwise, call your doctor.  Reactions should be reported to the Vaccine Adverse Event Reporting System (VAERS). Your doctor should file this report, or you can do it yourself through  the VAERS web site at www.vaers. [facility].gov, or by calling 9-917.763.2763. VAERS does not give medical advice. 6. The National Vaccine Injury Compensation Program    The [facility] Vaccine Injury Compensation Program (VICP) is a federal program that was created to compensate people who may have been injured by certain vaccines. Persons who believe they may have been injured by a vaccine can learn about the program and about filing a claim by calling 3-995.501.3496 or visiting the Anywhere to Go website at www.[facility].gov/vaccinecompensation. There is a time limit to file a claim for compensation. 7. How can I learn more?  Ask your healthcare provider. He or she can give you the vaccine package insert or suggest other sources of information.  Call your local or state health department.  Contact the Centers for Disease Control and Prevention (CDC):  - Call 7-979.865.8375 (1-800-CDC-INFO) or  - Visit CDCs website at www.cdc.gov/flu    Vaccine Information Statement   Inactivated Influenza Vaccine   8/7/2015  42 KUNAL Dimitry Calhoun [id number]    Department of Health and Human Services  Centers for Disease Control and Prevention    Office Use Only      Medicare Wellness Visit, Female    The best way to live healthy is to have a healthy lifestyle by eating a well-balanced diet, exercising regularly, limiting alcohol and stopping smoking. Regular physical exams and screening tests are another way to keep healthy. Preventive exams provided by your health care provider can find health problems before they become diseases or illnesses. Preventive services including immunizations, screening tests, monitoring and exams can help you take care of your own health. All people over age 72 should have a pneumovax  and and a prevnar shot to prevent pneumonia. These are once in a lifetime unless you and your provider decide differently. All people over 65 should have a yearly flu shot and a tetanus vaccine every 10 years. A bone mass density to screen for osteoporosis or thinning of the bones should be done every 2 years after 65. Screening for diabetes mellitus with a blood sugar test should be done every year. Glaucoma is a disease of the eye due to increased ocular pressure that can lead to blindness and it should be done every year by an eye professional.    Cardiovascular screening tests that check for elevated lipids (fatty part of blood) which can lead to heart disease and strokes should be done every 5 years. Colorectal screening that evaluates for blood or polyps in your colon should be done yearly as a stool test or every five years as a flexible sigmoidoscope or every 10 years as a colonoscopy up to age 76. Breast cancer screening with a mammogram is recommended biennially  for women age 54-69. Screening for cervical cancer with a pap smear and pelvic exam is recommended for women after age 72 years every 2 years up to age 79 or when the provider and patient decide to stop. If there is a history of cervical abnormalities or other increased risk for cancer then the test is recommended yearly. Hepatitis C screening is also recommended for anyone born between 80 through [address]. A shingles vaccine is also recommended once in a lifetime after age 61. Your Medicare Wellness Exam is recommended annually.     Here is a list of your current Health Maintenance items with a due date:  Health Maintenance Due   Topic Date Due    Eye Exam  10/02/2017

## 2017-08-23 NOTE — PROGRESS NOTES
Delfin Jordan is a 55 y.o.  female presents today for office visit for follow up. Pt is in Room # 2      1. Have you been to the ER, urgent care clinic since your last visit? Hospitalized since your last visit? No    2. Have you seen or consulted any other health care providers outside of the [address] [place] [address] since your last visit? Include any pap smears or colon screening. No    No Patient Care Coordination Note on file. Delfin Jordan is a 55 y.o. female who presents for routine immunizations. She denies any symptoms , reactions or allergies that would exclude them from being immunized today. Risks and adverse reactions were discussed and the VIS was given to them. All questions were addressed. She was observed for 10 min post injection. There were no reactions observed.     Stanislaw Lazaro LPN      VORB: Administer Flulaval via IM injection once./Rajani Srinivasan MD/ Stanislaw Lazaro LPN

## 2017-11-28 ENCOUNTER — OFFICE VISIT (OUTPATIENT)
Dept: FAMILY MEDICINE CLINIC | Facility: CLINIC | Age: 47
End: 2017-11-28

## 2017-11-28 VITALS
OXYGEN SATURATION: 95 % | DIASTOLIC BLOOD PRESSURE: 85 MMHG | SYSTOLIC BLOOD PRESSURE: 133 MMHG | TEMPERATURE: 99 F | RESPIRATION RATE: 16 BRPM | BODY MASS INDEX: 45.93 KG/M2 | WEIGHT: 269 LBS | HEIGHT: 64 IN | HEART RATE: 86 BPM

## 2017-11-28 DIAGNOSIS — E78.00 PURE HYPERCHOLESTEROLEMIA: ICD-10-CM

## 2017-11-28 DIAGNOSIS — D86.82: ICD-10-CM

## 2017-11-28 DIAGNOSIS — J20.9 ACUTE BRONCHITIS, UNSPECIFIED ORGANISM: Primary | ICD-10-CM

## 2017-11-28 DIAGNOSIS — E13.9 SECONDARY DIABETES (HCC): ICD-10-CM

## 2017-11-28 DIAGNOSIS — E55.9 VITAMIN D DEFICIENCY: ICD-10-CM

## 2017-11-28 DIAGNOSIS — E66.01 OBESITY, CLASS III, BMI 40-49.9 (MORBID OBESITY) (HCC): ICD-10-CM

## 2017-11-28 LAB — HBA1C MFR BLD HPLC: 6 %

## 2017-11-28 RX ORDER — BENZONATATE 100 MG/1
100 CAPSULE ORAL
Qty: 30 CAP | Refills: 0 | Status: SHIPPED | OUTPATIENT
Start: 2017-11-28 | End: 2017-12-05

## 2017-11-28 NOTE — MR AVS SNAPSHOT
Visit Information Date & Time Provider Department Dept. Phone Encounter #  
 11/28/2017  7:30 AM Carl Lion MD [facility] [phone] [id number] Follow-up Instructions Return in about 3 months (around 2/28/2018) for Follow up diabetes mellitus, Follow up hyperlipidemia, Go over lab/imaging results, Follow up hypert. Upcoming Health Maintenance Date Due  
 EYE EXAM RETINAL OR DILATED Q1 10/2/2017 HEMOGLOBIN A1C Q6M 2/3/2018 FOOT EXAM Q1 8/3/2018 MICROALBUMIN Q1 8/3/2018 LIPID PANEL Q1 8/3/2018 PAP AKA CERVICAL CYTOLOGY 12/8/2018 DTaP/Tdap/Td series (2 - Td) 5/18/2026 Allergies as of 11/28/2017  Review Complete On: 11/28/2017 By: Carl Lion MD  
  
 Severity Noted Reaction Type Reactions Pork/porcine Containing Products  02/16/2016    Hives Current Immunizations  Reviewed on 12/31/2014 Name Date Influenza Vaccine 10/8/2013 Influenza Vaccine (Quad) PF 8/23/2017  2:24 PM, 8/25/2015  8:18 AM  
 TB Skin Test (PPD) Intradermal 2/19/2016  4:00 PM, 12/31/2014, 11/30/2007 12:00 AM  
 Tetanus Toxoid, Adsorbed 4/19/2012 Not reviewed this visit You Were Diagnosed With   
  
 Codes Comments Acute bronchitis, unspecified organism    -  Primary ICD-10-CM: J20.9 ICD-9-CM: 466.0 Secondary diabetes [facility]     ICD-10-CM: E13.9 ICD-9-CM: 249.00 Pure hypercholesterolemia     ICD-10-CM: E78.00 ICD-9-CM: 272.0 Vitamin D deficiency     ICD-10-CM: E55.9 ICD-9-CM: 268.9 Obesity, Class III, BMI 40-49.9 (morbid obesity) (HCC)     ICD-10-CM: E66.01 
ICD-9-CM: 278.01 Vitals BP Pulse Temp Resp Height(growth percentile) Weight(growth percentile) 133/85 (BP 1 Location: Left arm, BP Patient Position: Sitting) 86 99 °F (37.2 °C) (Oral) 16 5' 4\" (1.626 m) 269 lb (122 kg) SpO2 BMI OB Status Smoking Status 95% 46.17 kg/m2 Hysterectomy Former Smoker Vitals History BMI and BSA Data Body Mass Index Body Surface Area  
 46.17 kg/m 2 2.35 m 2 Preferred Pharmacy Pharmacy Name Phone Lisa Thomas [address] [place] [address] Giulia Starr Your Updated Medication List  
  
   
This list is accurate as of: 11/28/17  8:55 AM.  Always use your most recent med list. amLODIPine 10 mg tablet Commonly known as:  Kaci Salle Take 1 tablet by mouth daily. aspirin delayed-release 81 mg tablet Take  by mouth daily. azaTHIOprine 50 mg tablet Commonly known as:  [employer] Take 50 mg by mouth daily. 3 tab daily  
  
 benzonatate 100 mg capsule Commonly known as:  TESSALON Take 1 Cap by mouth three (3) times daily as needed for Cough for up to 7 days. CALCIUM 600 + D 600-125 mg-unit Tab Generic drug:  calcium-cholecalciferol (d3) Take  by mouth.  
  
 etodolac 400 mg tablet Commonly known as:  LODINE Take  by mouth two (2) times daily (with meals). ferrous sulfate 325 mg (65 mg iron) tablet Take by Mouth Once a Day. IMITREX 100 mg tablet Generic drug:  SUMAtriptan Take 100 mg by mouth once as needed for Migraine. ondansetron 4 mg disintegrating tablet Commonly known as:  ZOFRAN ODT Take 1 Tab by mouth every eight (8) hours as needed for Nausea. [address] Take  by mouth. PEPCID 20 mg tablet Generic drug:  famotidine Take 20 mg by mouth two (2) times a day. pravastatin 40 mg tablet Commonly known as:  PRAVACHOL Take 1 Tab by mouth nightly. PULMICORT FLEXHALER 180 mcg/actuation Aepb inhaler Generic drug:  budesonide Take  by inhalation. solifenacin 10 mg tablet Commonly known as:  VESIcare Take 1 Tab by mouth daily. topiramate 50 mg tablet Commonly known as:  TOPAMAX Take 50 mg by mouth two (2) times a day. 100 mg at hs  
  
 valsartan-hydroCHLOROthiazide 320-12.5 mg per tablet Commonly known as:  DIOVAN-HCT Take 1 Tab by mouth daily. Prescriptions Sent to Pharmacy Refills  
 benzonatate (TESSALON) 100 mg capsule 0 Sig: Take 1 Cap by mouth three (3) times daily as needed for Cough for up to 7 days. Class: Normal  
 Pharmacy: [facility] [address] [place] [address] #: [phone] Route: Oral  
  
We Performed the Following AMB POC HEMOGLOBIN A1C [22446 CPT(R)] Follow-up Instructions Return in about 3 months (around 2/28/2018) for Follow up diabetes mellitus, Follow up hyperlipidemia, Go over lab/imaging results, Follow up hypert. To-Do List   
 11/28/2017 Lab:  VITAMIN D, 25 HYDROXY   
  
 11/30/2017 8:00 AM  
  Appointment with [facility] at [facility] ([phone]) OUTSIDE FILMS  - Any outside films related to the study being scheduled should be brought with you on the day of the exam.  If this cannot be done there may be a delay in the reading of the study. MEDICATIONS  - Patient must bring a complete list of all medications currently taking to include prescriptions, over-the-counter meds, herbals, vitamins & any dietary supplements  GENERAL INSTRUCTIONS  - On the day of your exam do not use any bath powder, deodorant or lotions on the armpit area. -Tenderness of breasts may cause an increase of discomfort during procedure. If you are experiencing breast tenderness on the day of your appointment and would like to reschedule, please call [phone].  
  
 12/01/2017 Lab:  LIPID PANEL   
  
 12/01/2017 Lab:  METABOLIC PANEL, COMPREHENSIVE Patient Instructions Bronchitis: Care Instructions Your Care Instructions Bronchitis is inflammation of the bronchial tubes, which carry air to the lungs. The tubes swell and produce mucus, or phlegm. The mucus and inflamed bronchial tubes make you cough. You may have trouble breathing. Most cases of bronchitis are caused by viruses like those that cause colds. Antibiotics usually do not help and they may be harmful. Bronchitis usually develops rapidly and lasts about 2 to 3 weeks in otherwise healthy people. Follow-up care is a key part of your treatment and safety. Be sure to make and go to all appointments, and call your doctor if you are having problems. It's also a good idea to know your test results and keep a list of the medicines you take. How can you care for yourself at home? · Take all medicines exactly as prescribed. Call your doctor if you think you are having a problem with your medicine. · Get some extra rest. 
· Take an over-the-counter pain medicine, such as acetaminophen (Tylenol), ibuprofen (Advil, Motrin), or naproxen (Aleve) to reduce fever and relieve body aches. Read and follow all instructions on the label. · Do not take two or more pain medicines at the same time unless the doctor told you to. Many pain medicines have acetaminophen, which is Tylenol. Too much acetaminophen (Tylenol) can be harmful. · Take an over-the-counter cough medicine that contains dextromethorphan to help quiet a dry, hacking cough so that you can sleep. Avoid cough medicines that have more than one active ingredient. Read and follow all instructions on the label. · Breathe moist air from a humidifier, hot shower, or sink filled with hot water. The heat and moisture will thin mucus so you can cough it out. · Do not smoke. Smoking can make bronchitis worse. If you need help quitting, talk to your doctor about stop-smoking programs and medicines. These can increase your chances of quitting for good. When should you call for help? Call 911 anytime you think you may need emergency care. For example, call if: 
? · You have severe trouble breathing. ?Call your doctor now or seek immediate medical care if: 
? · You have new or worse trouble breathing. ? · You cough up dark brown or bloody mucus (sputum). ? · You have a new or higher fever. ? · You have a new rash. ? Watch closely for changes in your health, and be sure to contact your doctor if: 
? · You cough more deeply or more often, especially if you notice more mucus or a change in the color of your mucus. ? · You are not getting better as expected. Where can you learn more? Go to http://jeanette-pedro.info/. Enter H333 in the search box to learn more about \"Bronchitis: Care Instructions. \" Current as of: May 12, 2017 Content Version: 11.4 © 7444-3634 [employer]. Care instructions adapted under license by [employer] - is now [employer] (which disclaims liability or warranty for this information). If you have questions about a medical condition or this instruction, always ask your healthcare professional. [address] any warranty or liability for your use of this information. Introducing [facility]! Dear Debi Riveras: Thank you for requesting a Akt[employer] account. Our records indicate that you already have an active Aktino account. You can access your account anytime at [email or web address] Did you know that you can access your hospital and ER discharge instructions at any time in Aktino? You can also review all of your test results from your hospital stay or ER visit. Additional Information If you have questions, please visit the Frequently Asked Questions section of the Aktino website at [email or web address] Remember, Aktino is NOT to be used for urgent needs. For medical emergencies, dial 911. Now available from your iPhone and Android! Please provide this summary of care documentation to your next provider. Your primary care clinician is listed as Isac Iniguez. If you have any questions after today's visit, please call [phone].

## 2017-11-28 NOTE — PATIENT INSTRUCTIONS
Bronchitis: Care Instructions  Your Care Instructions    Bronchitis is inflammation of the bronchial tubes, which carry air to the lungs. The tubes swell and produce mucus, or phlegm. The mucus and inflamed bronchial tubes make you cough. You may have trouble breathing. Most cases of bronchitis are caused by viruses like those that cause colds. Antibiotics usually do not help and they may be harmful. Bronchitis usually develops rapidly and lasts about 2 to 3 weeks in otherwise healthy people. Follow-up care is a key part of your treatment and safety. Be sure to make and go to all appointments, and call your doctor if you are having problems. It's also a good idea to know your test results and keep a list of the medicines you take. How can you care for yourself at home? · Take all medicines exactly as prescribed. Call your doctor if you think you are having a problem with your medicine. · Get some extra rest.  · Take an over-the-counter pain medicine, such as acetaminophen (Tylenol), ibuprofen (Advil, Motrin), or naproxen (Aleve) to reduce fever and relieve body aches. Read and follow all instructions on the label. · Do not take two or more pain medicines at the same time unless the doctor told you to. Many pain medicines have acetaminophen, which is Tylenol. Too much acetaminophen (Tylenol) can be harmful. · Take an over-the-counter cough medicine that contains dextromethorphan to help quiet a dry, hacking cough so that you can sleep. Avoid cough medicines that have more than one active ingredient. Read and follow all instructions on the label. · Breathe moist air from a humidifier, hot shower, or sink filled with hot water. The heat and moisture will thin mucus so you can cough it out. · Do not smoke. Smoking can make bronchitis worse. If you need help quitting, talk to your doctor about stop-smoking programs and medicines. These can increase your chances of quitting for good.   When should you call for help? Call 911 anytime you think you may need emergency care. For example, call if:  ? · You have severe trouble breathing. ?Call your doctor now or seek immediate medical care if:  ? · You have new or worse trouble breathing. ? · You cough up dark brown or bloody mucus (sputum). ? · You have a new or higher fever. ? · You have a new rash. ? Watch closely for changes in your health, and be sure to contact your doctor if:  ? · You cough more deeply or more often, especially if you notice more mucus or a change in the color of your mucus. ? · You are not getting better as expected. Where can you learn more? Go to http://jeanette-pedro.info/. Enter H333 in the search box to learn more about \"Bronchitis: Care Instructions. \"  Current as of: May 12, 2017  Content Version: 11.4  © 7966-9948 [employer]. Care instructions adapted under license by [employer] (which disclaims liability or warranty for this information). If you have questions about a medical condition or this instruction, always ask your healthcare professional. [address] any warranty or liability for your use of this information.

## 2017-11-28 NOTE — PROGRESS NOTES
Internal Medicine Progress Note    Today's Date:  2017   Patient:  Elizabeth Jo  Patient :  1970    Subjective:     Chief Complaint   Patient presents with    Cholesterol Problem    Hypertension      Cough   This is an acute problem. This is not at goal. Symptoms started a week ago. Pt has been taking OTC robitussin. This is not effective. Cough is worse at night. Cough is dry. No sick contacts. Sarcoidosis  This is a chronic problem. This is stable. Pt takes imuran and pulmicort. Pt sees a rheumatologist and pulmonologist.  Last eye exam was 10/2016. Pneumonia vaccine is up to date. Secondary diabetes  This is a chronic problem. BS is at goal. Pt was on januvia. Pt is on aspirin and statin. Hypertension   This is a chronic problem. BP is at goal. Pt takes norvasc and valsartan/HCTZ. Pt reports compliance with these medications. Past Medical History:   Diagnosis Date    Acute sinusitis 2015    Anemia     Chronic pain     Diabetes ([facility]     Headache     HTN (hypertension) 2015    HTN (hypertension), benign 2015    Hypertension     Obesity, Class II, BMI 35-39.9 2016    Obesity, Class III, BMI 40-49.9 (morbid obesity) ([facility] 2015    Pure hypercholesterolemia 2016    Secondary diabetes ([facility] 2015    Viral pharyngitis 2016    Vitamin D deficiency 2016     Past Surgical History:   Procedure Laterality Date    HX HYSTERECTOMY  10/20/2013    HX ORTHOPAEDIC  2012    left foot fusion      reports that she quit smoking about 31 years ago. She has a 5.00 pack-year smoking history. She has never used smokeless tobacco. She reports that she drinks about 8.4 oz of alcohol per week  She reports that she does not use illicit drugs.   Family History   Problem Relation Age of Onset    Asthma Mother     Heart Disease Father      arhythmia    Stroke Father 48     TIA    Alcohol abuse Father     Heart Disease Paternal Grandmother Allergies   Allergen Reactions    Pork/Porcine Containing Products Hives     Review of Systems   Positives in bold  CV:      chest pain, palpitations  PULM:  SOB, wheezing, cough, sputum production    Current Outpatient Meds and Allergies     Current Outpatient Prescriptions on File Prior to Visit   Medication Sig Dispense Refill    solifenacin (VESICARE) 10 mg tablet Take 1 Tab by mouth daily. 90 Tab 3    pravastatin (PRAVACHOL) 40 mg tablet Take 1 Tab by mouth nightly. 90 Tab 3    ondansetron (ZOFRAN ODT) 4 mg disintegrating tablet Take 1 Tab by mouth every eight (8) hours as needed for Nausea. 90 Tab 0    valsartan-hydroCHLOROthiazide (DIOVAN-HCT) 320-12.5 mg per tablet Take 1 Tab by mouth daily. 90 Tab 1    ferrous sulfate 325 mg (65 mg iron) tablet Take by Mouth Once a Day.  MV,CA,MIN/IRON/FA/GUARANA/CAFF (ONE-A-DAY WOMEN'S ACTIVE PO) Take  by mouth.  calcium-cholecalciferol, d3, (CALCIUM 600 + D) 600-125 mg-unit tab Take  by mouth.  famotidine (PEPCID) 20 mg tablet Take 20 mg by mouth two (2) times a day.  etodolac (LODINE) 400 mg tablet Take  by mouth two (2) times daily (with meals).  topiramate (TOPAMAX) 50 mg tablet Take 50 mg by mouth two (2) times a day. 100 mg at hs      azathioprine (IMURAN) 50 mg tablet Take 50 mg by mouth daily. 3 tab daily      SUMAtriptan (IMITREX) 100 mg tablet Take 100 mg by mouth once as needed for Migraine.  aspirin delayed-release 81 mg tablet Take  by mouth daily.  budesonide (PULMICORT FLEXHALER) 180 mcg/actuation aepb inhaler Take  by inhalation.  amlodipine (NORVASC) 10 mg tablet Take 1 tablet by mouth daily. 90 tablet 1     No current facility-administered medications on file prior to visit.       Allergies   Allergen Reactions    Pork/Porcine Containing Products Hives     Objective:     VS:    Visit Vitals    /85 (BP 1 Location: Left arm, BP Patient Position: Sitting)    Pulse 86    Temp 99 °F (37.2 °C) (Oral)  Resp 16    Ht 5' 4\" (1.626 m)    Wt 269 lb (122 kg)    SpO2 95%    BMI 46.17 kg/m2     General:   Well-nourished, well-groomed, pleasant, alert, in no acute distress  Head:  Normocephalic, atraumatic  Ears:  External ears WNL  Nose:  External nares WNL  Psych:  No pressured speech, no abnormal thought content    Office Visit on 2017   Component Date Value Ref Range Status    Hemoglobin A1c (POC) 2017 6.0  % Final     Assessment/Plan & Orders:         ICD-10-CM ICD-9-CM    1. Acute bronchitis, unspecified organism J20.9 466.0 benzonatate (TESSALON) 100 mg capsule   2. Secondary diabetes ([address] E13.9 249.00 AMB POC HEMOGLOBIN T9B      METABOLIC PANEL, COMPREHENSIVE      METABOLIC PANEL, COMPREHENSIVE   3. Pure hypercholesterolemia E78.00 272.0 LIPID PANEL      LIPID PANEL   4. Vitamin D deficiency E55.9 268.9 VITAMIN D, 25 HYDROXY      VITAMIN D, 25 HYDROXY   5. Obesity, Class III, BMI 40-49.9 (morbid obesity) (HCC) E66.01 278.01 AMB POC HEMOGLOBIN A1C      Healthy lifestyle has been encouraged including avoidance of tobacco, limiting or avoiding alcohol intake, heart healthy diet which is low in cholesterol and saturated fat and contains fresh fruits, vegetables and whole grains and fiber, regular exercise with goals of 20-30 minutes 3-5 days weekly and maintaining an optimal BMI. Follow up with rheumatology and pulmonary   reviewed  Depression screenin17    Follow-up Disposition:  Return in about 3 months (around 2018) for Follow up diabetes mellitus, Follow up hyperlipidemia, Go over lab/imaging results, Follow up hypert. *Patient verbalized understanding and agreement with the plan. Patient was given an after-visit summary. Mayo Sanchez MD - Internal Medicine  2017, 8:00 AM  [facility]  [address], [address]  Phone [phone]  Fax [phone]    Diagnoses and all orders for this visit:    1.  Acute bronchitis, unspecified organism  -     benzonatate (TESSALON) 100 mg capsule; Take 1 Cap by mouth three (3) times daily as needed for Cough for up to 7 days. 2. Secondary diabetes ([address]  -     AMB POC HEMOGLOBIN I6E  -     METABOLIC PANEL, COMPREHENSIVE; Future    3. Pure hypercholesterolemia  -     LIPID PANEL; Future    4. Vitamin D deficiency  -     VITAMIN D, 25 HYDROXY; Future    5. Obesity, Class III, BMI 40-49.9 (morbid obesity) (HCC)  -     AMB POC HEMOGLOBIN A1C    6. 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  Lab Results   Component Value Date/Time    WBC 7.7 08/03/2017 09:53 PM    HGB 13.3 08/03/2017 09:53 PM    HCT 42.3 08/03/2017 09:53 PM    PLATELET 326 86/99/5230 09:53 PM    Creatinine 0.7 08/03/2017 09:53 PM    BUN 9 08/03/2017 09:53 PM    Potassium 3.6 08/03/2017 09:53 PM

## 2017-11-28 NOTE — PROGRESS NOTES
Chief Complaint   Patient presents with    Cholesterol Problem    Hypertension     Visit Vitals    /85 (BP 1 Location: Left arm, BP Patient Position: Sitting)    Pulse 86    Temp 99 °F (37.2 °C) (Oral)    Resp 16    Ht 5' 4\" (1.626 m)    Wt 269 lb (122 kg)    SpO2 95%    BMI 46.17 kg/m2       Patient would like to discuss cold symptoms. Patient in room # 2.     1. Have you been to the ER, urgent care clinic since your last visit? Hospitalized since your last visit? No    2. Have you seen or consulted any other health care providers outside of the [address] [place] [address] since your last visit? Include any pap smears or colon screening. Yes When: 10/2017 Where: [facility], Pulmonary Reason for visit: Sacrodosis    HM Reviewed.   Verbal order for in office hgba1c per Nancy Pacheco MD/Arlin Peters LPN

## 2017-11-29 LAB
25(OH)D3 SERPL-MCNC: 24.7 NG/ML (ref 32–100)
A-G RATIO,AGRAT: 1.4 RATIO (ref 1.1–2.6)
ALBUMIN SERPL-MCNC: 4.1 G/DL (ref 3.5–5)
ALP SERPL-CCNC: 121 U/L (ref 25–115)
ALT SERPL-CCNC: 28 U/L (ref 5–40)
ANION GAP SERPL CALC-SCNC: 13 MMOL/L
AST SERPL W P-5'-P-CCNC: 19 U/L (ref 10–37)
BILIRUB SERPL-MCNC: 0.5 MG/DL (ref 0.2–1.2)
BUN SERPL-MCNC: 8 MG/DL (ref 6–22)
CALCIUM SERPL-MCNC: 9.1 MG/DL (ref 8.4–10.5)
CHLORIDE SERPL-SCNC: 103 MMOL/L (ref 98–110)
CHOLEST SERPL-MCNC: 203 MG/DL (ref 110–200)
CO2 SERPL-SCNC: 26 MMOL/L (ref 20–32)
CREAT SERPL-MCNC: 0.8 MG/DL (ref 0.5–1.2)
GFRAA, 66117: >60
GFRNA, 66118: >60
GLOBULIN,GLOB: 3 G/DL (ref 2–4)
GLUCOSE SERPL-MCNC: 106 MG/DL (ref 70–99)
HDLC SERPL-MCNC: 48 MG/DL (ref 40–59)
LDLC SERPL CALC-MCNC: 139 MG/DL (ref 50–99)
POTASSIUM SERPL-SCNC: 4.1 MMOL/L (ref 3.5–5.5)
PROT SERPL-MCNC: 7.1 G/DL (ref 6.4–8.3)
SODIUM SERPL-SCNC: 142 MMOL/L (ref 133–145)
TRIGL SERPL-MCNC: 84 MG/DL (ref 40–149)
VLDLC SERPL CALC-MCNC: 17 MG/DL (ref 8–30)

## 2017-11-29 NOTE — ASSESSMENT & PLAN NOTE
This condition is managed by Specialist.  Lab Results   Component Value Date/Time    WBC 7.7 08/03/2017 09:53 PM    HGB 13.3 08/03/2017 09:53 PM    HCT 42.3 08/03/2017 09:53 PM    PLATELET 441 72/27/7657 09:53 PM    Creatinine 0.7 08/03/2017 09:53 PM    BUN 9 08/03/2017 09:53 PM    Potassium 3.6 08/03/2017 09:53 PM

## 2017-11-30 ENCOUNTER — HOSPITAL ENCOUNTER (OUTPATIENT)
Dept: MAMMOGRAPHY | Age: 47
Discharge: HOME | End: 2017-11-30
Attending: PHYSICIAN ASSISTANT
Payer: COMMERCIAL

## 2017-11-30 DIAGNOSIS — Z12.39 BREAST CANCER SCREENING: ICD-10-CM

## 2017-11-30 PROCEDURE — 77067 SCR MAMMO BI INCL CAD: CPT

## 2017-12-01 NOTE — PROGRESS NOTES
Result reviewed. LPN to call pt with results and put in ergocalciferol 50,000 units qwk x 12 wks with 3 refills. Cholesterol is also stable but to reach an LDL of less than 100, recommend switching to lipitor.

## 2017-12-04 ENCOUNTER — TELEPHONE (OUTPATIENT)
Dept: FAMILY MEDICINE CLINIC | Facility: CLINIC | Age: 47
End: 2017-12-04

## 2017-12-04 NOTE — TELEPHONE ENCOUNTER
Called home number. Left message on answering machine to return the call. This call was concerning message below per Dr. Alen Meade.

## 2017-12-04 NOTE — TELEPHONE ENCOUNTER
----- Message from Raffaele Rosas MD sent at 12/1/2017 10:00 AM EST -----  Result reviewed. LPN to call pt with results and put in ergocalciferol 50,000 units qwk x 12 wks with 3 refills. Cholesterol is also stable but to reach an LDL of less than 100, recommend switching to lipitor.

## 2017-12-12 RX ORDER — ERGOCALCIFEROL 1.25 MG/1
50000 CAPSULE ORAL
Qty: 12 CAP | Refills: 3 | Status: SHIPPED | OUTPATIENT
Start: 2017-12-12 | End: 2018-09-05 | Stop reason: SDUPTHER

## 2017-12-12 RX ORDER — ATORVASTATIN CALCIUM 10 MG/1
10 TABLET, FILM COATED ORAL DAILY
Qty: 90 TAB | Refills: 3 | Status: SHIPPED | OUTPATIENT
Start: 2017-12-12 | End: 2019-09-19

## 2017-12-12 NOTE — TELEPHONE ENCOUNTER
Called home number. Verified name and . Spoke with patient. Notified of message below per Dr. [address]. Verified pharmacy. Notified that will stop Pravastatin and start Lipitor. Patient had no further questions or concerns.

## 2018-01-22 DIAGNOSIS — Z00.00 ROUTINE GENERAL MEDICAL EXAMINATION AT A HEALTH CARE FACILITY: ICD-10-CM

## 2018-01-22 NOTE — TELEPHONE ENCOUNTER
Refill request for Ondansetron 4mg # 90. Last refill on 08/03/2017 #90 w 0 refills. Last office visit 11/28/2017. Next appointment on 02/28/2017. Requested pharmacy [facility].  Refill request sent to provider for approval or denial.

## 2018-01-23 RX ORDER — ONDANSETRON 4 MG/1
4 TABLET, ORALLY DISINTEGRATING ORAL
Qty: 30 TAB | Refills: 0 | Status: SHIPPED | OUTPATIENT
Start: 2018-01-23 | End: 2019-09-19

## 2018-01-24 DIAGNOSIS — Z00.00 ROUTINE GENERAL MEDICAL EXAMINATION AT A HEALTH CARE FACILITY: ICD-10-CM

## 2018-02-02 ENCOUNTER — OFFICE VISIT (OUTPATIENT)
Dept: FAMILY MEDICINE CLINIC | Facility: CLINIC | Age: 48
End: 2018-02-02

## 2018-02-02 VITALS
DIASTOLIC BLOOD PRESSURE: 98 MMHG | OXYGEN SATURATION: 96 % | RESPIRATION RATE: 12 BRPM | TEMPERATURE: 97.7 F | HEIGHT: 64 IN | WEIGHT: 265.8 LBS | BODY MASS INDEX: 45.38 KG/M2 | SYSTOLIC BLOOD PRESSURE: 150 MMHG | HEART RATE: 84 BPM

## 2018-02-02 DIAGNOSIS — E13.9 SECONDARY DIABETES (HCC): ICD-10-CM

## 2018-02-02 DIAGNOSIS — I10 HTN (HYPERTENSION), BENIGN: Primary | ICD-10-CM

## 2018-02-02 DIAGNOSIS — E78.00 PURE HYPERCHOLESTEROLEMIA: ICD-10-CM

## 2018-02-02 DIAGNOSIS — E55.9 VITAMIN D DEFICIENCY: ICD-10-CM

## 2018-02-02 DIAGNOSIS — D86.82: ICD-10-CM

## 2018-02-02 DIAGNOSIS — E66.01 OBESITY, CLASS III, BMI 40-49.9 (MORBID OBESITY) (HCC): ICD-10-CM

## 2018-02-02 LAB — GLUCOSE POC: 91 MG/DL

## 2018-02-02 RX ORDER — AMLODIPINE BESYLATE 10 MG/1
10 TABLET ORAL DAILY
Qty: 90 TAB | Refills: 3 | Status: SHIPPED | OUTPATIENT
Start: 2018-02-02

## 2018-02-02 RX ORDER — ALBUTEROL SULFATE 90 UG/1
AEROSOL, METERED RESPIRATORY (INHALATION) AS NEEDED
Refills: 2 | COMMUNITY
Start: 2018-01-20 | End: 2019-09-19

## 2018-02-02 RX ORDER — AMLODIPINE BESYLATE 10 MG/1
10 TABLET ORAL DAILY
Qty: 90 TAB | Refills: 1 | Status: CANCELLED | OUTPATIENT
Start: 2018-02-02

## 2018-02-02 RX ORDER — FLUCONAZOLE 150 MG/1
150 TABLET ORAL DAILY
Qty: 1 TAB | Refills: 0 | Status: SHIPPED | OUTPATIENT
Start: 2018-02-02 | End: 2018-02-03

## 2018-02-02 NOTE — MR AVS SNAPSHOT
[place] 
 
 
 [address] [facility] [address] [address] 
[phone] Patient: Shaina Suarez MRN: [id number] YDO:[id number] Visit Information Date & Time Provider Department Dept. Phone Encounter #  
 2/2/2018 10:30 AM Carisa Frankel MD [place] [phone] [id number] Follow-up Instructions Return in about 3 months (around 5/2/2018) for Follow up hypertension, Follow up hyperlipidemia, Go over lab/imaging results. Your Appointments 2/28/2018  8:00 AM  
Follow Up with Carisa Frankel MD  
[facility]) Appt Note: Return in about 3 months (around 2/28/2018) for Follow up diabetes mellitus, Follow up hyperlipidemia, Go over lab/imaging results, Follow up hypert. [address] [facility] [address] [phone]  
[address] [place] [address] Upcoming Health Maintenance Date Due  
 EYE EXAM RETINAL OR DILATED Q1 10/2/2017 HEMOGLOBIN A1C Q6M 5/28/2018 FOOT EXAM Q1 8/3/2018 MICROALBUMIN Q1 8/3/2018 LIPID PANEL Q1 11/28/2018 BREAST CANCER SCRN MAMMOGRAM 11/30/2018 PAP AKA CERVICAL CYTOLOGY 12/8/2018 DTaP/Tdap/Td series (2 - Td) 5/18/2026 Allergies as of 2/2/2018  Review Complete On: 2/2/2018 By: Carisa Frankel MD  
  
 Severity Noted Reaction Type Reactions Pork/porcine Containing Products  02/16/2016    Hives Current Immunizations  Reviewed on 12/31/2014 Name Date Influenza Vaccine 10/8/2013 Influenza Vaccine (Quad) PF 8/23/2017  2:24 PM, 8/25/2015  8:18 AM  
 TB Skin Test (PPD) Intradermal 2/19/2016  4:00 PM, 12/31/2014, 11/30/2007 12:00 AM  
 Tetanus Toxoid, Adsorbed 4/19/2012 Not reviewed this visit You Were Diagnosed With   
  
 Codes Comments HTN (hypertension), benign    -  Primary ICD-10-CM: I10 
ICD-9-CM: 401.1 Secondary diabetes ([address]     ICD-10-CM: E13.9 ICD-9-CM: 249.00   
 Obesity, Class III, BMI 40-49.9 (morbid obesity) (HCC)     ICD-10-CM: E66.01 
ICD-9-CM: 278.01   
 Pure hypercholesterolemia     ICD-10-CM: E78.00 ICD-9-CM: 272.0 Vitamin D deficiency     ICD-10-CM: E55.9 ICD-9-CM: 268.9 Vitals BP Pulse Temp Resp Height(growth percentile) Weight(growth percentile) (!) 150/98 (BP 1 Location: Left arm, BP Patient Position: Sitting) 84 97.7 °F (36.5 °C) (Oral) 12 5' 4\" (1.626 m) 265 lb 12.8 oz (120.6 kg) SpO2 BMI OB Status Smoking Status 96% 45.62 kg/m2 Hysterectomy Former Smoker Vitals History BMI and BSA Data Body Mass Index Body Surface Area  
 45.62 kg/m 2 2.33 m 2 Preferred Pharmacy Pharmacy Name Phone [facility] [address] [phone] Your Updated Medication List  
  
   
This list is accurate as of: 2/2/18 10:49 AM.  Always use your most recent med list. amLODIPine 10 mg tablet Commonly known as:  Moon Hair Take 1 Tab by mouth daily. aspirin delayed-release 81 mg tablet Take  by mouth daily. atorvastatin 10 mg tablet Commonly known as:  LIPITOR Take 1 Tab by mouth daily. CALCIUM 600 + D 600-125 mg-unit Tab Generic drug:  calcium-cholecalciferol (d3) Take  by mouth.  
  
 ergocalciferol 50,000 unit capsule Commonly known as:  ERGOCALCIFEROL Take 1 Cap by mouth every seven (7) days. Indications: Vitamin D Deficiency  
  
 etodolac 400 mg tablet Commonly known as:  LODINE Take  by mouth two (2) times daily (with meals). ferrous sulfate 325 mg (65 mg iron) tablet Take by Mouth Once a Day. fluconazole 150 mg tablet Commonly known as:  DIFLUCAN Take 1 Tab by mouth daily for 1 day. FDA advises cautious prescribing of oral fluconazole in pregnancy. IMITREX 100 mg tablet Generic drug:  SUMAtriptan Take 100 mg by mouth once as needed for Migraine. ondansetron 4 mg disintegrating tablet Commonly known as:  ZOFRAN ODT Take 1 Tab by mouth every eight (8) hours as needed for Nausea. [address] Take  by mouth. PEPCID 20 mg tablet Generic drug:  famotidine Take 20 mg by mouth two (2) times a day. PULMICORT FLEXHALER 180 mcg/actuation Aepb inhaler Generic drug:  budesonide Take  by inhalation. solifenacin 10 mg tablet Commonly known as:  VESIcare Take 1 Tab by mouth daily. topiramate 50 mg tablet Commonly known as:  TOPAMAX Take 50 mg by mouth two (2) times a day. 100 mg at hs  
  
 valsartan-hydroCHLOROthiazide 320-12.5 mg per tablet Commonly known as:  DIOVAN-HCT Take 1 Tab by mouth daily. VENTOLIN HFA 90 mcg/actuation inhaler Generic drug:  albuterol  
as needed. Prescriptions Sent to Pharmacy Refills  
 amLODIPine (NORVASC) 10 mg tablet 3 Sig: Take 1 Tab by mouth daily. Class: Normal  
 Pharmacy: [address] Ph #: [phone] Route: Oral  
 fluconazole (DIFLUCAN) 150 mg tablet 0 Sig: Take 1 Tab by mouth daily for 1 day. FDA advises cautious prescribing of oral fluconazole in pregnancy. Class: Normal  
 Pharmacy: [address] Ph #: [phone] Route: Oral  
  
We Performed the Following AMB POC GLUCOSE, QUANTITATIVE, BLOOD [58242 CPT(R)] Follow-up Instructions Return in about 3 months (around 5/2/2018) for Follow up hypertension, Follow up hyperlipidemia, Go over lab/imaging results. To-Do List   
 02/02/2018 Lab:  VITAMIN D, 25 HYDROXY   
  
 02/05/2018 Lab:  LIPID PANEL Introducing [facility]! Dear Joaquín Ibrahim: Thank you for requesting a [employer] account.   Our records indicate that you already have an active [facility] account. You can access your account anytime at [email or web address] Did you know that you can access your hospital and ER discharge instructions at any time in [facility]? You can also review all of your test results from your hospital stay or ER visit. Additional Information If you have questions, please visit the Frequently Asked Questions section of the [employer] website at [email or web address] Remember, [facility] is NOT to be used for urgent needs. For medical emergencies, dial 911. Now available from your iPhone and Android! Please provide this summary of care documentation to your next provider. Your primary care clinician is listed as Biju Walton. If you have any questions after today's visit, please call [phone].

## 2018-02-02 NOTE — PATIENT INSTRUCTIONS
Vaginal Yeast Infection: Care Instructions  Your Care Instructions    A vaginal yeast infection is caused by too many yeast cells in the vagina. This is common in women of all ages. Itching, vaginal discharge and irritation, and other symptoms can bother you. But yeast infections don't often cause other health problems. Some medicines can increase your risk of getting a yeast infection. These include antibiotics, birth control pills, hormones, and steroids. You may also be more likely to get a yeast infection if you are pregnant, have diabetes, douche, or wear tight clothes. With treatment, most yeast infections get better in 2 to 3 days. Follow-up care is a key part of your treatment and safety. Be sure to make and go to all appointments, and call your doctor if you are having problems. It's also a good idea to know your test results and keep a list of the medicines you take. How can you care for yourself at home? · Take your medicines exactly as prescribed. Call your doctor if you think you are having a problem with your medicine. · Ask your doctor about over-the-counter (OTC) medicines for yeast infections. They may cost less than prescription medicines. If you use an OTC treatment, read and follow all instructions on the label. · Do not use tampons while using a vaginal cream or suppository. The tampons can absorb the medicine. Use pads instead. · Wear loose cotton clothing. Do not wear nylon or other fabric that holds body heat and moisture close to the skin. · Try sleeping without underwear. · Do not scratch. Relieve itching with a cold pack or a cool bath. · Do not wash your vaginal area more than once a day. Use plain water or a mild, unscented soap. Air-dry the vaginal area. · Change out of wet swimsuits after swimming. · Do not have sex until you have finished your treatment. · Do not douche. When should you call for help?   Call your doctor now or seek immediate medical care if:  ? · You have unexpected vaginal bleeding. ? · You have new or increased pain in your vagina or pelvis. ? Watch closely for changes in your health, and be sure to contact your doctor if:  ? · You have a fever. ? · You are not getting better after 2 days. ? · Your symptoms come back after you finish your medicines. Where can you learn more? Go to http://jeanette-pedro.info/. Enter X017 in the search box to learn more about \"Vaginal Yeast Infection: Care Instructions. \"  Current as of: October 13, 2016  Content Version: 11.4  © 2628-4016 [employer]. Care instructions adapted under license by [employer] (which disclaims liability or warranty for this information). If you have questions about a medical condition or this instruction, always ask your healthcare professional. [address] any warranty or liability for your use of this information.

## 2018-02-02 NOTE — ASSESSMENT & PLAN NOTE
Uncontrolled, based on history, physical exam and review of pertinent labs, studies and medications; meds reconciled; lifestyle modifications recommended. Key Obesity Meds     Patient is on no anti-obesity meds.         Lab Results   Component Value Date/Time    Hemoglobin A1c 6.4 08/03/2017 09:53 PM    Glucose 106 11/28/2017 09:00 AM    Cholesterol, total 203 11/28/2017 09:00 AM    HDL Cholesterol 48 11/28/2017 09:00 AM    LDL, calculated 139 11/28/2017 09:00 AM    Triglyceride 84 11/28/2017 09:00 AM    TSH 1.31 08/03/2017 09:53 PM    Sodium 142 11/28/2017 09:00 AM    Potassium 4.1 11/28/2017 09:00 AM    ALT (SGPT) 28 11/28/2017 09:00 AM    AST (SGOT) 19 11/28/2017 09:00 AM    VITAMIN D, 25-HYDROXY 24.7 11/28/2017 09:00 AM

## 2018-02-02 NOTE — ASSESSMENT & PLAN NOTE
Well Controlled, based on history, physical exam and review of pertinent labs, studies and medications; meds reconciled; continue current treatment plan. Key Antihyperglycemic Medications     Patient is on no antihyperglycemic meds. Other Key Diabetic Medications             atorvastatin (LIPITOR) 10 mg tablet  (Taking) Take 1 Tab by mouth daily. valsartan-hydroCHLOROthiazide (DIOVAN-HCT) 320-12.5 mg per tablet  (Taking) Take 1 Tab by mouth daily. topiramate (TOPAMAX) 50 mg tablet  (Taking) Take 50 mg by mouth two (2) times a day.  100 mg at hs        Lab Results   Component Value Date/Time    Hemoglobin A1c 6.4 08/03/2017 09:53 PM    Hemoglobin A1c (POC) 6.0 11/28/2017 08:37 AM    Glucose 106 11/28/2017 09:00 AM    Creatinine 0.8 11/28/2017 09:00 AM    Microalb/Creat ratio (ug/mg creat.)  08/03/2017 09:53 PM      Comment:      ** Unable to calculate Microablumin/Creatinine Ratio due to low Microalbumin    Cholesterol, total 203 11/28/2017 09:00 AM    HDL Cholesterol 48 11/28/2017 09:00 AM    LDL, calculated 139 11/28/2017 09:00 AM    Triglyceride 84 11/28/2017 09:00 AM     Diabetic Foot and Eye Exam HM Status   Topic Date Due    Eye Exam  10/02/2017    Diabetic Foot Care  08/03/2018

## 2018-02-02 NOTE — PROGRESS NOTES
Chief Complaint   Patient presents with    Diabetes    Cholesterol Problem    Yeast Infection     3 days    Hypertension       Vitals:    02/02/18 1020 02/02/18 1035   BP: (!) 152/100  Comment: manual (!) 150/98  Comment: manual   BP 1 Location: Right arm Left arm   BP Patient Position: Sitting Sitting   Pulse: 84    Resp: 12    Temp: 97.7 °F (36.5 °C)    TempSrc: Oral    SpO2: 96%    Weight: 265 lb 12.8 oz (120.6 kg)    Height: 5' 4\" (1.626 m)      Patient is fasting. Patient in room # 3.     1. Have you been to the ER, urgent care clinic since your last visit? Hospitalized since your last visit? Yes When: 01/2017 Where: [facility] Reason for visit: cold symptoms    2. Have you seen or consulted any other health care providers outside of the [address] [place] [address] since your last visit? Include any pap smears or colon screening. Yes When: 1/26/2018 Where: Dr. Susan Sandoval, Pulmonary Reason for visit: Follow-up for Sacrodosis    HM Reviewed.  Patient to make appointment for eye exam.  Verbal order for in office glucose per Bartolome Dave MD/Arlin Peters LPN

## 2018-02-02 NOTE — PROGRESS NOTES
Internal Medicine Progress Note    Today's Date:  2018   Patient:  Carl Toure  Patient :  1970    Subjective:     Chief Complaint   Patient presents with    Diabetes    Cholesterol Problem    Yeast Infection     3 days    Hypertension      Vaginal itching and discomfort  This is an acute problem. This is not at goal. Symptoms started three days ago. Pt was recently prescribed antibiotics. Sarcoidosis  This is a chronic problem. This is stable. Pt takes imuran and pulmicort. Pt sees a rheumatologist and pulmonologist.  Last eye exam was 10/2016. Pneumonia vaccine is up to date. Secondary diabetes  This is a chronic problem. BS is at goal. Pt was on januvia. Pt is on aspirin and statin. Hypertension   This is a chronic problem. BP is at goal. Pt takes norvasc and valsartan/HCTZ. Pt reports compliance with these medications. Past Medical History:   Diagnosis Date    Acute sinusitis 2015    Anemia     Chronic pain     Diabetes ([address]     Headache     HTN (hypertension) 2015    HTN (hypertension), benign 2015    Hypertension     Obesity, Class II, BMI 35-39.9 2016    Obesity, Class III, BMI 40-49.9 (morbid obesity) ([address] 2015    Pure hypercholesterolemia 2016    Secondary diabetes ([address] 2015    Viral pharyngitis 2016    Vitamin D deficiency 2016     Past Surgical History:   Procedure Laterality Date    HX HYSTERECTOMY  10/20/2013    HX ORTHOPAEDIC  2012    left foot fusion      reports that she quit smoking about 32 years ago. She has a 5.00 pack-year smoking history. She has never used smokeless tobacco. She reports that she drinks about 8.4 oz of alcohol per week  She reports that she does not use illicit drugs.   Family History   Problem Relation Age of Onset    Asthma Mother     Heart Disease Father      arhythmia    Stroke Father 48     TIA    Alcohol abuse Father     Heart Disease Paternal Grandmother Allergies   Allergen Reactions    Pork/Porcine Containing Products Hives     Review of Systems   Positives in bold  CV:      chest pain, palpitations  PULM:  SOB, wheezing, cough, sputum production    Current Outpatient Meds and Allergies     Current Outpatient Prescriptions on File Prior to Visit   Medication Sig Dispense Refill    ondansetron (ZOFRAN ODT) 4 mg disintegrating tablet Take 1 Tab by mouth every eight (8) hours as needed for Nausea. 30 Tab 0    ergocalciferol (ERGOCALCIFEROL) 50,000 unit capsule Take 1 Cap by mouth every seven (7) days. Indications: Vitamin D Deficiency 12 Cap 3    atorvastatin (LIPITOR) 10 mg tablet Take 1 Tab by mouth daily. 90 Tab 3    solifenacin (VESICARE) 10 mg tablet Take 1 Tab by mouth daily. 90 Tab 3    valsartan-hydroCHLOROthiazide (DIOVAN-HCT) 320-12.5 mg per tablet Take 1 Tab by mouth daily. 90 Tab 1    ferrous sulfate 325 mg (65 mg iron) tablet Take by Mouth Once a Day.  MV,CA,MIN/IRON/FA/GUARANA/CAFF (ONE-A-DAY WOMEN'S ACTIVE PO) Take  by mouth.  calcium-cholecalciferol, d3, (CALCIUM 600 + D) 600-125 mg-unit tab Take  by mouth.  famotidine (PEPCID) 20 mg tablet Take 20 mg by mouth two (2) times a day.  etodolac (LODINE) 400 mg tablet Take  by mouth two (2) times daily (with meals).  topiramate (TOPAMAX) 50 mg tablet Take 50 mg by mouth two (2) times a day. 100 mg at hs      SUMAtriptan (IMITREX) 100 mg tablet Take 100 mg by mouth once as needed for Migraine.  aspirin delayed-release 81 mg tablet Take  by mouth daily.  budesonide (PULMICORT FLEXHALER) 180 mcg/actuation aepb inhaler Take  by inhalation. No current facility-administered medications on file prior to visit.       Allergies   Allergen Reactions    Pork/Porcine Containing Products Hives     Objective:     VS:    Visit Vitals    BP (!) 150/98 (BP 1 Location: Left arm, BP Patient Position: Sitting)  Comment: manual    Pulse 84    Temp 97.7 °F (36.5 °C) (Oral)  Resp 12    Ht 5' 4\" (1.626 m)    Wt 265 lb 12.8 oz (120.6 kg)    SpO2 96%    BMI 45.62 kg/m2     General:   Well-nourished, well-groomed, pleasant, alert, in no acute distress  Head:  Normocephalic, atraumatic  Ears:  External ears WNL  Nose:  External nares WNL  Psych:  No pressured speech, no abnormal thought content    PHQ over the last two weeks 8/23/2017   Little interest or pleasure in doing things Not at all   Feeling down, depressed or hopeless Not at all   Total Score PHQ 2 0     Office Visit on 02/02/2018   Component Date Value Ref Range Status    Glucose POC 02/02/2018 91  mg/dL Final   Office Visit on 11/28/2017   Component Date Value Ref Range Status    Hemoglobin A1c (POC) 11/28/2017 6.0  % Final    Triglyceride 11/28/2017 84  40 - 149 mg/dL Final    HDL Cholesterol 11/28/2017 48  40 - 59 mg/dL Final    Cholesterol, total 11/28/2017 203* 110 - 200 mg/dL Final    LDL, calculated 11/28/2017 139* 50 - 99 mg/dL Final    VLDL, calculated 11/28/2017 17  8 - 30 mg/dL Final    Comment: Test includes cholesterol, HDL cholesterol, triglycerides and LDL. Cholesterol Recommended NCEP guidelines in mg/dL:  Less than 200      Desirable  200 - 239          Borderline High  Greater than or  = to 240   High      Glucose 11/28/2017 106* 70 - 99 mg/dL Final    BUN 11/28/2017 8  6 - 22 mg/dL Final    Creatinine 11/28/2017 0.8  0.5 - 1.2 mg/dL Final    Sodium 11/28/2017 142  133 - 145 mmol/L Final    Potassium 11/28/2017 4.1  3.5 - 5.5 mmol/L Final    Chloride 11/28/2017 103  98 - 110 mmol/L Final    CO2 11/28/2017 26  20 - 32 mmol/L Final    AST (SGOT) 11/28/2017 19  10 - 37 U/L Final    ALT (SGPT) 11/28/2017 28  5 - 40 U/L Final    Alk.  phosphatase 11/28/2017 121* 25 - 115 U/L Final    Bilirubin, total 11/28/2017 0.5  0.2 - 1.2 mg/dL Final    Calcium 11/28/2017 9.1  8.4 - 10.5 mg/dL Final    Protein, total 11/28/2017 7.1  6.4 - 8.3 g/dL Final    Albumin 11/28/2017 4.1  3.5 - 5.0 g/dL Final  A-G Ratio 2017 1.4  1.1 - 2.6 ratio Final    Globulin 2017 3.0  2.0 - 4.0 g/dL Final    Anion gap 2017 13.0  mmol/L Final    Comment: Test includes Albumin, Alkaline Phosphatase, ALT, AST, BUN, Calcium, CO2,  Chloride, Creatinine, Glucose, Potassium, Sodium, Total Bilirubin and Total  Protein. Estimated GFR results are reported in mL/min/1.73 sq.m. by the MDRD equation. This eGFR is validated for stable chronic renal failure patients. This   equation  is unreliable in acute illness or patients with normal renal function.  GFRAA 2017 >60.0  >60.0 Final    GFRNA 2017 >60.0  >60.0 Final    VITAMIN D, 25-HYDROXY 2017 24.7* 32.0 - 100.0 ng/mL Final     Assessment/Plan & Orders:         ICD-10-CM ICD-9-CM    1. HTN (hypertension), benign I10 401.1 amLODIPine (NORVASC) 10 mg tablet   2. Secondary diabetes ([facility] E13.9 249.00 AMB POC GLUCOSE, QUANTITATIVE, BLOOD      HEMOGLOBIN A1C W/O EAG   3. Pure hypercholesterolemia E78.00 272.0 LIPID PANEL   4. Vitamin D deficiency E55.9 268.9 VITAMIN D, 25 HYDROXY   5. Obesity, Class III, BMI 40-49.9 (morbid obesity) ([facility]) E66.01 278.01    6. 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 Follow up with rheumatology and pulmonary  Depression screenin17    Follow-up Disposition:  Return in about 3 months (around 2018) for Follow up hypertension, Follow up hyperlipidemia, Go over lab/imaging results. *Patient verbalized understanding and agreement with the plan. Patient was given an after-visit summary. Reid Murillo.  [facility] MD Daniel - Internal Medicine  2018, 8:00 AM  Sandhya Summers [facility]  [address], [address] [place] [address] Drive  Phone [phone]  Fax [phone]    Diagnoses and all orders for this visit:    1. HTN (hypertension), benign  -     amLODIPine (NORVASC) 10 mg tablet; Take 1 Tab by mouth daily. 2. Secondary diabetes [facility])  Assessment & Plan:  Well Controlled, based on history, physical exam and review of pertinent labs, studies and medications; meds reconciled; continue current treatment plan. Key Antihyperglycemic Medications     Patient is on no antihyperglycemic meds. Other Key Diabetic Medications             atorvastatin (LIPITOR) 10 mg tablet  (Taking) Take 1 Tab by mouth daily. valsartan-hydroCHLOROthiazide (DIOVAN-HCT) 320-12.5 mg per tablet  (Taking) Take 1 Tab by mouth daily. topiramate (TOPAMAX) 50 mg tablet  (Taking) Take 50 mg by mouth two (2) times a day. 100 mg at hs        Lab Results   Component Value Date/Time    Hemoglobin A1c 6.4 08/03/2017 09:53 PM    Hemoglobin A1c (POC) 6.0 11/28/2017 08:37 AM    Glucose 106 11/28/2017 09:00 AM    Creatinine 0.8 11/28/2017 09:00 AM    Microalb/Creat ratio (ug/mg creat.)  08/03/2017 09:53 PM      Comment:      ** Unable to calculate Microablumin/Creatinine Ratio due to low Microalbumin    Cholesterol, total 203 11/28/2017 09:00 AM    HDL Cholesterol 48 11/28/2017 09:00 AM    LDL, calculated 139 11/28/2017 09:00 AM    Triglyceride 84 11/28/2017 09:00 AM     Diabetic Foot and Eye Exam HM Status   Topic Date Due    Eye Exam  10/02/2017    Diabetic Foot Care  08/03/2018       Orders:  -     AMB POC GLUCOSE, QUANTITATIVE, BLOOD  -     HEMOGLOBIN A1C W/O EAG; Future    3. Pure hypercholesterolemia  -     LIPID PANEL; Future    4. Vitamin D deficiency  -     VITAMIN D, 25 HYDROXY; Future    5.  Obesity, Class III, BMI 40-49.9 (morbid obesity) ([facility]  Assessment & Plan:  Uncontrolled, based on history, physical exam and review of pertinent labs, studies and medications; meds reconciled; lifestyle modifications recommended. Key Obesity Meds     Patient is on no anti-obesity meds. Lab Results   Component Value Date/Time    Hemoglobin A1c 6.4 08/03/2017 09:53 PM    Glucose 106 11/28/2017 09:00 AM    Cholesterol, total 203 11/28/2017 09:00 AM    HDL Cholesterol 48 11/28/2017 09:00 AM    LDL, calculated 139 11/28/2017 09:00 AM    Triglyceride 84 11/28/2017 09:00 AM    TSH 1.31 08/03/2017 09:53 PM    Sodium 142 11/28/2017 09:00 AM    Potassium 4.1 11/28/2017 09:00 AM    ALT (SGPT) 28 11/28/2017 09:00 AM    AST (SGOT) 19 11/28/2017 09:00 AM    VITAMIN D, 25-HYDROXY 24.7 11/28/2017 09:00 AM             6. 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  Lab Results   Component Value Date/Time    WBC 7.7 08/03/2017 09:53 PM    HGB 13.3 08/03/2017 09:53 PM    HCT 42.3 08/03/2017 09:53 PM    PLATELET 278 22/05/9824 09:53 PM    Creatinine 0.8 11/28/2017 09:00 AM    BUN 8 11/28/2017 09:00 AM    Potassium 4.1 11/28/2017 09:00 AM         Other orders  -     fluconazole (DIFLUCAN) 150 mg tablet; Take 1 Tab by mouth daily for 1 day. FDA advises cautious prescribing of oral fluconazole in pregnancy.

## 2018-02-02 NOTE — ASSESSMENT & PLAN NOTE
This condition is managed by Specialist.  Lab Results   Component Value Date/Time    WBC 7.7 08/03/2017 09:53 PM    HGB 13.3 08/03/2017 09:53 PM    HCT 42.3 08/03/2017 09:53 PM    PLATELET 626 97/26/0385 09:53 PM    Creatinine 0.8 11/28/2017 09:00 AM    BUN 8 11/28/2017 09:00 AM    Potassium 4.1 11/28/2017 09:00 AM

## 2018-02-05 DIAGNOSIS — E78.00 PURE HYPERCHOLESTEROLEMIA: ICD-10-CM

## 2018-08-27 ENCOUNTER — OFFICE VISIT (OUTPATIENT)
Dept: FAMILY MEDICINE CLINIC | Facility: CLINIC | Age: 48
End: 2018-08-27

## 2018-08-27 VITALS
HEART RATE: 73 BPM | SYSTOLIC BLOOD PRESSURE: 165 MMHG | WEIGHT: 259.9 LBS | BODY MASS INDEX: 44.37 KG/M2 | HEIGHT: 64 IN | DIASTOLIC BLOOD PRESSURE: 96 MMHG | OXYGEN SATURATION: 98 % | TEMPERATURE: 98.3 F | RESPIRATION RATE: 15 BRPM

## 2018-08-27 DIAGNOSIS — J01.40 ACUTE PANSINUSITIS, RECURRENCE NOT SPECIFIED: Primary | ICD-10-CM

## 2018-08-27 DIAGNOSIS — E13.9 SECONDARY DIABETES (HCC): ICD-10-CM

## 2018-08-27 DIAGNOSIS — D86.9 SARCOIDOSIS: ICD-10-CM

## 2018-08-27 DIAGNOSIS — I10 ESSENTIAL HYPERTENSION: ICD-10-CM

## 2018-08-27 RX ORDER — AMOXICILLIN 875 MG/1
875 TABLET, FILM COATED ORAL 2 TIMES DAILY
Qty: 20 TAB | Refills: 0 | Status: SHIPPED | OUTPATIENT
Start: 2018-08-27 | End: 2018-09-05

## 2018-08-27 NOTE — PROGRESS NOTES
Onesimo Sloan is a 52 y.o.@ presents today for office visit for acute. Pt is in Room # 5.     1. Have you been to the ER, urgent care clinic since your last visit? Hospitalized since your last visit? No    2. Have you seen or consulted any other health care providers outside of the [address] [place] [address] since your last visit? Include any pap smears or colon screening. No         Health Maintenance reviewed - defered/.     Requested Prescriptions      No prescriptions requested or ordered in this encounter       Visit Vitals    BP (!) 165/96 (BP 1 Location: Left arm, BP Patient Position: Sitting)    Pulse 73    Temp 98.3 °F (36.8 °C) (Oral)    Resp 15    Ht 5' 4\" (1.626 m)    Wt 259 lb 14.4 oz (117.9 kg)    SpO2 98%    BMI 44.61 kg/m2          Upcoming Appts  No.     VORB: No orders of the defined types were placed in this encounter.   MD Trinh Hampton LPN

## 2018-08-27 NOTE — MR AVS SNAPSHOT
[address] 
 
 
 [address] [facility] [phone] 
[phone] Patient: Rohit Timmons MRN: [id number] VRT:[id number] Visit Information Date & Time Provider Department Dept. Phone Encounter #  
 8/27/2018  8:00 AM Isaac Casanova [facility] [phone] [id number] Follow-up Instructions Return if symptoms worsen or fail to improve. Your Appointments 9/5/2018  3:30 PM  
Complete Physical with Jori Queen MD  
[facility] ([address]) Appt Note: annual  
 [address] [facility] [phone]  
[address] [address] Upcoming Health Maintenance Date Due  
 EYE EXAM RETINAL OR DILATED Q1 10/2/2017 HEMOGLOBIN A1C Q6M 5/28/2018 Influenza Age 5 to Adult 8/1/2018 FOOT EXAM Q1 8/3/2018 MICROALBUMIN Q1 8/3/2018 BREAST CANCER SCRN MAMMOGRAM 11/30/2018 PAP AKA CERVICAL CYTOLOGY 12/8/2018 LIPID PANEL Q1 11/28/2018 DTaP/Tdap/Td series (2 - Td) 5/18/2026 Allergies as of 8/27/2018  Review Complete On: 8/27/2018 By: Raymon Vargas LPN Severity Noted Reaction Type Reactions Pork/porcine Containing Products  02/16/2016    Hives Current Immunizations  Reviewed on 12/31/2014 Name Date Influenza Vaccine 10/8/2013 Influenza Vaccine (Quad) PF 8/23/2017  2:24 PM, 8/25/2015  8:18 AM  
 TB Skin Test (PPD) Intradermal 2/19/2016  4:00 PM, 12/31/2014, 11/30/2007 12:00 AM  
 Tetanus Toxoid, Adsorbed 4/19/2012 Not reviewed this visit You Were Diagnosed With   
  
 Codes Comments Acute pansinusitis, recurrence not specified    -  Primary ICD-10-CM: J01.40 ICD-9-CM: 461.8 Essential hypertension     ICD-10-CM: I10 
ICD-9-CM: 401.9 Sarcoidosis     ICD-10-CM: D86.9 ICD-9-CM: 135 Secondary diabetes ([facility] [address] [address]     ICD-10-CM: E13.9 ICD-9-CM: 249.00   
 BMI 40.0-44.9, adult [facility])     ICD-10-CM: Z68.41 
ICD-9-CM: V85.41 Vitals BP Pulse Temp Resp Height(growth percentile) Weight(growth percentile) (!) 165/96 (BP 1 Location: Left arm, BP Patient Position: Sitting) 73 98.3 °F (36.8 °C) (Oral) 15 5' 4\" (1.626 m) 259 lb 14.4 oz (117.9 kg) SpO2 BMI OB Status Smoking Status 98% 44.61 kg/m2 Hysterectomy Former Smoker Vitals History BMI and BSA Data Body Mass Index Body Surface Area  
 44.61 kg/m 2 2.31 m 2 Preferred Pharmacy Pharmacy Name Phone [facility] [place], [address] [place] [address] [phone] Your Updated Medication List  
  
   
This list is accurate as of 8/27/18  8:51 AM.  Always use your most recent med list. amLODIPine 10 mg tablet Commonly known as:  Nichole Charu Take 1 Tab by mouth daily. amoxicillin 875 mg tablet Commonly known as:  AMOXIL Take 1 Tab by mouth two (2) times a day for 10 days. aspirin delayed-release 81 mg tablet Take  by mouth daily. atorvastatin 10 mg tablet Commonly known as:  LIPITOR Take 1 Tab by mouth daily. CALCIUM 600 + D 600-125 mg-unit Tab Generic drug:  calcium-cholecalciferol (d3) Take  by mouth.  
  
 ergocalciferol 50,000 unit capsule Commonly known as:  ERGOCALCIFEROL Take 1 Cap by mouth every seven (7) days. Indications: Vitamin D Deficiency  
  
 etodolac 400 mg tablet Commonly known as:  LODINE Take  by mouth two (2) times daily (with meals). ferrous sulfate 325 mg (65 mg iron) tablet Take by Mouth Once a Day. IMITREX 100 mg tablet Generic drug:  SUMAtriptan Take 100 mg by mouth once as needed for Migraine. ondansetron 4 mg disintegrating tablet Commonly known as:  ZOFRAN ODT Take 1 Tab by mouth every eight (8) hours as needed for Nausea. [address] Take  by mouth. PEPCID 20 mg tablet Generic drug:  famotidine Take 20 mg by mouth two (2) times a day. PULMICORT FLEXHALER 180 mcg/actuation Aepb inhaler Generic drug:  budesonide Take  by inhalation. solifenacin 10 mg tablet Commonly known as:  VESIcare Take 1 Tab by mouth daily. topiramate 50 mg tablet Commonly known as:  TOPAMAX Take 50 mg by mouth two (2) times a day. 100 mg at hs  
  
 valsartan-hydroCHLOROthiazide 320-12.5 mg per tablet Commonly known as:  DIOVAN-HCT Take 1 Tab by mouth daily. VENTOLIN HFA 90 mcg/actuation inhaler Generic drug:  albuterol  
as needed. Prescriptions Sent to Pharmacy Refills  
 amoxicillin (AMOXIL) 875 mg tablet 0 Sig: Take 1 Tab by mouth two (2) times a day for 10 days. Class: Normal  
 Pharmacy: [facility] [address] #: [phone] Route: Oral  
  
Follow-up Instructions Return if symptoms worsen or fail to improve. Introducing [facility]! Dear Julian Smith: Thank you for requesting a OtherInbox account. Our records indicate that you already have an active OtherInbox account. You can access your account anytime at [email or web address]. [email or web address] Did you know that you can access your hospital and ER discharge instructions at any time in OtherInbox? You can also review all of your test results from your hospital stay or ER visit. Additional Information If you have questions, please visit the Frequently Asked Questions section of the OtherInbox website at [email or web address] Remember, OtherInbox is NOT to be used for urgent needs. For medical emergencies, dial 911. Now available from your iPhone and Android! Please provide this summary of care documentation to your next provider. Your primary care clinician is listed as Kenyon Farias.  If you have any questions after today's visit, please call [phone].

## 2018-08-27 NOTE — PROGRESS NOTES
Subjective:   Keron Castellon is a 52 y.o. female who presents for c/o sinus infection. Ms. Marty Spencer presents today with a 10 day history of nasal congestion, facial pressure behind forehead and periorbital region, bilateral ear discomfort, mild sore throat (initially), and occasional nonproductive cough. She has taken OTC sinus medication without improvement. She denies fever/chills, or dental pain. HTN:  Pt is taking amlodipine and valsartan-hctz daily. She reports compliance with antihypertensive medication. She denies headache, dizziness, SOB, chest pain, palpitations, orthopnea, pnd, or leg swelling. Secondary diabetes:  Pt was previously taking  Januvia. She is now managing her condition with diet. She is taking aspirin and atorvastatin. Last hemoglobin A1c was 6.0 in 11/2017. Sarcoidosis: This is stable. Pt takes Imuran and Pulmacort. She is followed by pulmonary and rheumatology. Eye exam was 10/2016. Pt is up to date with pneumococcal vaccine. Review of Systems   Constitutional: Negative for chills, fever and malaise/fatigue. HENT: Positive for congestion, ear pain, sinus pain and sore throat (improved). Respiratory: Positive for cough. Negative for sputum production, shortness of breath and wheezing. Cardiovascular: Negative for chest pain, palpitations, orthopnea, claudication, leg swelling and PND. Musculoskeletal: Negative for myalgias. Current Outpatient Prescriptions on File Prior to Visit   Medication Sig Dispense Refill    VENTOLIN HFA 90 mcg/actuation inhaler as needed. 2    amLODIPine (NORVASC) 10 mg tablet Take 1 Tab by mouth daily. 90 Tab 3    ondansetron (ZOFRAN ODT) 4 mg disintegrating tablet Take 1 Tab by mouth every eight (8) hours as needed for Nausea. 30 Tab 0    ergocalciferol (ERGOCALCIFEROL) 50,000 unit capsule Take 1 Cap by mouth every seven (7) days.  Indications: Vitamin D Deficiency 12 Cap 3    atorvastatin (LIPITOR) 10 mg tablet Take 1 Tab by mouth daily. 90 Tab 3    solifenacin (VESICARE) 10 mg tablet Take 1 Tab by mouth daily. 90 Tab 3    valsartan-hydroCHLOROthiazide (DIOVAN-HCT) 320-12.5 mg per tablet Take 1 Tab by mouth daily. 90 Tab 1    ferrous sulfate 325 mg (65 mg iron) tablet Take by Mouth Once a Day.  MV,CA,MIN/IRON/FA/GUARANA/CAFF (ONE-A-DAY WOMEN'S ACTIVE PO) Take  by mouth.  calcium-cholecalciferol, d3, (CALCIUM 600 + D) 600-125 mg-unit tab Take  by mouth.  famotidine (PEPCID) 20 mg tablet Take 20 mg by mouth two (2) times a day.  etodolac (LODINE) 400 mg tablet Take  by mouth two (2) times daily (with meals).  topiramate (TOPAMAX) 50 mg tablet Take 50 mg by mouth two (2) times a day. 100 mg at hs      SUMAtriptan (IMITREX) 100 mg tablet Take 100 mg by mouth once as needed for Migraine.  aspirin delayed-release 81 mg tablet Take  by mouth daily.  budesonide (PULMICORT FLEXHALER) 180 mcg/actuation aepb inhaler Take  by inhalation. No current facility-administered medications on file prior to visit. Reviewed PmHx, RxHx, FmHx, SocHx, AllgHx and updated and dated in the chart. Nurse notes were reviewed and are correct    Objective:     Vitals:    08/27/18 0818 08/27/18 0823   BP: (!) 165/96 (!) 165/96   Pulse: 73    Resp: 15    Temp: 98.3 °F (36.8 °C)    TempSrc: Oral    SpO2: 98%    Weight: 259 lb 14.4 oz (117.9 kg)    Height: 5' 4\" (1.626 m)      Physical Exam   Constitutional: She is oriented to person, place, and time. She appears well-developed and well-nourished. HENT:   Head: Normocephalic and atraumatic. Right Ear: Hearing, tympanic membrane, external ear and ear canal normal.   Left Ear: Hearing, tympanic membrane, external ear and ear canal normal.   Nose: Mucosal edema present. Right sinus exhibits maxillary sinus tenderness and frontal sinus tenderness. Left sinus exhibits maxillary sinus tenderness and frontal sinus tenderness.    Mouth/Throat: Oropharynx is clear and moist.   Eyes: Conjunctivae and EOM are normal. Pupils are equal, round, and reactive to light. Neck: Normal range of motion. Neck supple. Cardiovascular: Normal rate, regular rhythm, normal heart sounds and intact distal pulses. Pulmonary/Chest: Effort normal and breath sounds normal.   Abdominal: Soft. Bowel sounds are normal.   Musculoskeletal: Normal range of motion. She exhibits no edema. Lymphadenopathy:     She has no cervical adenopathy. Neurological: She is alert and oriented to person, place, and time. Skin: Skin is warm and dry. Psychiatric: She has a normal mood and affect. Nursing note and vitals reviewed. Assessment/ Plan:     Diagnoses and all orders for this visit:    1. Acute pansinusitis, recurrence not specified  -     amoxicillin (AMOXIL) 875 mg tablet; Take 1 Tab by mouth two (2) times a day for 10 days.  -     Advised to increase water intake, perform sinus rinse, may use acetaminophen prn, Flonase prn. Advised to avoid medications with pseudoephedrine as they may elevate BP.  -     RTC if no improvement or worsening symptoms    2. Essential hypertension        -     BP is elevated today. Advised to continue current regimen. Monitor home readings and keep log to bring visit next week. -     Counseled on DASH diet. 3. Sarcoidosis        -     Stable        -     Continue current medication regimen. -     Follow up with pulmonary and rheumatology as scheduled. 4. Secondary diabetes ([address]        -     Controlled with diet. Last hemoglobin A1c 6.0        -     Obtain hemoglobin A1c    5. BMI 40.0-44.9, adult ([address]        -     Counseled on lifestyle modification with diet and regular exercise. I have discussed the diagnosis with the patient and the intended plan as seen in the above orders. The patient verbalized understanding and agrees with the plan.     Follow-up Disposition: Not on [facility] III, MD

## 2018-09-05 ENCOUNTER — OFFICE VISIT (OUTPATIENT)
Dept: FAMILY MEDICINE CLINIC | Facility: CLINIC | Age: 48
End: 2018-09-05

## 2018-09-05 VITALS
WEIGHT: 258 LBS | DIASTOLIC BLOOD PRESSURE: 80 MMHG | OXYGEN SATURATION: 92 % | HEART RATE: 91 BPM | BODY MASS INDEX: 44.05 KG/M2 | SYSTOLIC BLOOD PRESSURE: 130 MMHG | HEIGHT: 64 IN | RESPIRATION RATE: 18 BRPM | TEMPERATURE: 98.1 F

## 2018-09-05 DIAGNOSIS — E13.9 SECONDARY DIABETES (HCC): ICD-10-CM

## 2018-09-05 DIAGNOSIS — E78.00 PURE HYPERCHOLESTEROLEMIA: ICD-10-CM

## 2018-09-05 DIAGNOSIS — E55.9 VITAMIN D DEFICIENCY: ICD-10-CM

## 2018-09-05 DIAGNOSIS — I10 HTN (HYPERTENSION), BENIGN: ICD-10-CM

## 2018-09-05 DIAGNOSIS — Z23 ENCOUNTER FOR IMMUNIZATION: ICD-10-CM

## 2018-09-05 DIAGNOSIS — Z00.00 ROUTINE GENERAL MEDICAL EXAMINATION AT A HEALTH CARE FACILITY: Primary | ICD-10-CM

## 2018-09-05 DIAGNOSIS — Z13.31 SCREENING FOR DEPRESSION: ICD-10-CM

## 2018-09-05 DIAGNOSIS — N89.8 VAGINAL DISCHARGE: ICD-10-CM

## 2018-09-05 LAB — HBA1C MFR BLD HPLC: 5.6 %

## 2018-09-05 RX ORDER — ERGOCALCIFEROL 1.25 MG/1
50000 CAPSULE ORAL
Qty: 12 CAP | Refills: 0 | Status: SHIPPED | OUTPATIENT
Start: 2018-09-05 | End: 2019-09-19

## 2018-09-05 RX ORDER — FLUCONAZOLE 150 MG/1
150 TABLET ORAL DAILY
Qty: 1 TAB | Refills: 0 | Status: SHIPPED | OUTPATIENT
Start: 2018-09-05 | End: 2018-09-06

## 2018-09-05 NOTE — PROGRESS NOTES
History and Physical 
 
Today's Date:  2018 Patient's Name: Stephanie Raman Patient's :  1970 History: Chief Complaint Patient presents with  Diabetes  Hypertension  Annual Exam  
 Weight Management  Vaginal Discharge Vaginal discharge This is an acute problem. This is not at goal. It is foul smelling. Pt was recently prescribed antibiotics.  
  
Sarcoidosis This is a chronic problem. This is stable. Pt takes pulmicort. Pt sees a rheumatologist and pulmonologist.  
   
Hypertension This is a chronic problem. BP is at goal. Pt takes norvasc and valsartan/HCTZ. Pt reports compliance with these medications. Past Medical History:  
Diagnosis Date  Acute sinusitis 2015  Anemia  Chronic pain  Diabetes ([facility] [address]  Headache   
 HTN (hypertension), benign 2015  Hypertension  Obesity, Class II, BMI 35-39.9 2016  Obesity, Class III, BMI 40-49.9 (morbid obesity) ([facility] [address] 2015  Pure hypercholesterolemia 2016  Secondary diabetes ([facility] [address] 2015  Viral pharyngitis 2016  Vitamin D deficiency 2016 Past Surgical History:  
Procedure Laterality Date  HX HYSTERECTOMY  10/20/2013  HX ORTHOPAEDIC  2012  
 left foot fusion  
 
 reports that she quit smoking about 32 years ago. She has a 5.00 pack-year smoking history. She has never used smokeless tobacco. She reports that she drinks about 8.4 oz of alcohol per week  She reports that she does not use illicit drugs. Family History Problem Relation Age of Onset  Asthma Mother  Heart Disease Father   
  arhythmia  Stroke Father 48 TIA  Alcohol abuse Father  Heart Disease Paternal Grandmother Allergies Allergen Reactions  Pork/Porcine Containing Products Hives Problem List:  
  
Patient Active Problem List  
Diagnosis Code  Asthma J45.909  Sarcoidosis D86.9  Cervico-occipital neuralgia M54.81  
  HTN (hypertension), benign I10  Migraine with aura G43. [facility]  Obesity, Class III, BMI 40-49.9 (morbid obesity) ([facility]) E66.01  
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  
 
Current Outpatient Prescriptions Medication Sig  
 fluconazole (DIFLUCAN) 150 mg tablet Take 1 Tab by mouth daily for 1 day. FDA advises cautious prescribing of oral fluconazole in pregnancy.  ergocalciferol (ERGOCALCIFEROL) 50,000 unit capsule Take 1 Cap by mouth every seven (7) days. Indications: Vitamin D Deficiency  VENTOLIN HFA 90 mcg/actuation inhaler as needed.  amLODIPine (NORVASC) 10 mg tablet Take 1 Tab by mouth daily.  ondansetron (ZOFRAN ODT) 4 mg disintegrating tablet Take 1 Tab by mouth every eight (8) hours as needed for Nausea.  atorvastatin (LIPITOR) 10 mg tablet Take 1 Tab by mouth daily.  solifenacin (VESICARE) 10 mg tablet Take 1 Tab by mouth daily.  valsartan-hydroCHLOROthiazide (DIOVAN-HCT) 320-12.5 mg per tablet Take 1 Tab by mouth daily.  ferrous sulfate 325 mg (65 mg iron) tablet Take by Mouth Once a Day.  MV,CA,MIN/IRON/FA/GUARANA/CAFF (ONE-A-DAY WOMEN'S ACTIVE PO) Take  by mouth.  famotidine (PEPCID) 20 mg tablet Take 20 mg by mouth two (2) times a day.  etodolac (LODINE) 400 mg tablet Take  by mouth two (2) times daily (with meals).  topiramate (TOPAMAX) 50 mg tablet Take 50 mg by mouth two (2) times a day. 100 mg at hs  SUMAtriptan (IMITREX) 100 mg tablet Take 100 mg by mouth once as needed for Migraine.  aspirin delayed-release 81 mg tablet Take  by mouth daily.  budesonide (PULMICORT FLEXHALER) 180 mcg/actuation aepb inhaler Take  by inhalation. No current facility-administered medications for this visit. Review of Systems:  
(Positives in bold) General:   fevers, chills, generalized weakness, fatigue, weight loss, night sweats, appetite change Neurologic: dizziness, lightheadedness, headaches, loss of consciousness, numbness, tingling, focal weakness Eyes:  vision changes (sees the eye dr), double vision, photophobia Ears:  change in hearing, ear pain, ear discharge, ear ringing Nose:  sneezing, runny nose, nasal congestion Mouth/Throat: sore throat, voice change, dry mouth, difficulty swallowing Neck:  pain, stiffness, swelling Respiratory: dyspnea at rest, dyspnea on exertion, wheezing, cough, sputum production Cardiovascular:   chest pain, palpitations, pedal edema, leg cramps Breasts: lumps, discharge, pain, rash, skin changes, changes on self-exam 
Gastrointestinal:  nausea, vomiting, abdominal pain, constipation, diarrhea, heart burn, bloody stools, tarry black stools, rectal pain, hemorrhoids Urinary: dysuria, urinary frequency, nocturia, malodorous urine Genital (F): vaginal discharge (with odor), ulcerations, rashes, change in menses (s/p hysterectomy due to endometriosis), pelvic pain Musculoskeletal:  joint pain, joint stiffness, joint swelling, back pain, focal muscle pain, diffuse myalgias Psychiatric: insomnia, anxiety, depression, hallucinations, suicidal ideation, homicidal ideation Endocrine: polydipsia, polyuria, polyphagia, cold intolerance, heat intolerance Hematologic: easy bruising, easy bleeding Dermatologic: Itching, rash Physical Assessment:  
VS:   
Visit Vitals  /80 (BP 1 Location: Left arm, BP Patient Position: Sitting) Comment: manual 
Comment (BP Patient Position): manual  
 Pulse 91  Temp 98.1 °F (36.7 °C) (Oral)  Resp 18  Ht 5' 4\" (1.626 m)  Wt 258 lb (117 kg)  SpO2 92%  BMI 44.29 kg/m2 General:   Well-groomed, well-nourished, in no distress, pleasant, alert, appropriate and conversant. Eyes:    PERRL, EOMI Ears:  TMs normal, no ear wax Mouth:  MMM, good dentition, oropharynx WNL without membranes, exudates, petechiae or ulcers Neck:   Neck supple, no swelling, mass or tenderness Cardiovascular:   No JVD. RRR, no MRG. Pulmonary:   Lungs clear bilaterally. Normal respiratory effort. Abdomen:   Abdomen soft, NT, ND, NAB Extremities:   No edema, LEs warm and well-perfused. Neuro:   Alert and oriented, no focal deficits. No facial asymmetry noted. Skin:    No rash or jaundice MSK:   Normal ROM, 5/5 muscle strength Psych:  No pressured speech or abnormal thought content PHQ over the last two weeks 9/5/2018 Little interest or pleasure in doing things Several days Feeling down, depressed, irritable, or hopeless Several days Total Score PHQ 2 2 Lab Results Component Value Date/Time WBC 7.7 08/03/2017 09:53 PM  
 HGB 13.3 08/03/2017 09:53 PM  
 HCT 42.3 08/03/2017 09:53 PM  
 PLATELET 357 18/18/8379 09:53 PM  
 MCV 86 08/03/2017 09:53 PM  
 
Lab Results Component Value Date/Time Hemoglobin A1c 6.4 (H) 08/03/2017 09:53 PM  
 Hemoglobin A1c 6.0 (H) 08/04/2016 03:31 PM  
 Hemoglobin A1c 6.3 (H) 02/16/2016 02:37 PM  
 Glucose 106 (H) 11/28/2017 09:00 AM  
 Glucose POC 91 02/02/2018 10:30 AM  
 Microalb/Creat ratio (ug/mg creat.)  08/03/2017 09:53 PM  
   Comment:  
   ** Unable to calculate Microablumin/Creatinine Ratio due to low Microalbumin LDL, calculated 139 (H) 11/28/2017 09:00 AM  
 Creatinine 0.8 11/28/2017 09:00 AM  
  
Lab Results Component Value Date/Time Cholesterol, total 203 (H) 11/28/2017 09:00 AM  
 HDL Cholesterol 48 11/28/2017 09:00 AM  
 LDL, calculated 139 (H) 11/28/2017 09:00 AM  
 Triglyceride 84 11/28/2017 09:00 AM  
 
Lab Results Component Value Date/Time ALT (SGPT) 28 11/28/2017 09:00 AM  
 AST (SGOT) 19 11/28/2017 09:00 AM  
 Alk.  phosphatase 121 (H) 11/28/2017 09:00 AM  
 Bilirubin, total 0.5 11/28/2017 09:00 AM  
 Albumin 4.1 11/28/2017 09:00 AM  
 Protein, total 7.1 11/28/2017 09:00 AM  
 INR, External 1.03 02/01/2016 08:42 AM  
 Prothrombin time, External 10.8 02/01/2016 08:42 AM  
 PLATELET 681 27/05/6626 09:53 PM  
 
 
Lab Results Component Value Date/Time Creatinine 0.8 11/28/2017 09:00 AM  
 BUN 8 11/28/2017 09:00 AM  
 Sodium 142 11/28/2017 09:00 AM  
 Potassium 4.1 11/28/2017 09:00 AM  
 Chloride 103 11/28/2017 09:00 AM  
 CO2 26 11/28/2017 09:00 AM  
 
Lab Results Component Value Date/Time TSH 1.31 08/03/2017 09:53 PM  
 T4, Free 1.1 08/03/2017 09:53 PM  
  
Lab Results Component Value Date/Time Glucose 106 (H) 11/28/2017 09:00 AM  
 Glucose POC 91 02/02/2018 10:30 AM  
  
Assessment/Plan & Orders: ICD-10-CM ICD-9-CM 1. Routine general medical examination at a health care facility Z00.00 V70.0 2. Vaginal discharge N89.8 623.5 fluconazole (DIFLUCAN) 150 mg tablet 3. HTN (hypertension), benign I10 401.1 4. Pure hypercholesterolemia E78.00 272.0   
5. Vitamin D deficiency E55.9 268.9 ergocalciferol (ERGOCALCIFEROL) 50,000 unit capsule 6. Secondary diabetes ([address] E13.9 249.00 AMB POC HEMOGLOBIN A1C  
   CANCELED: MICROALBUMIN, UR, RAND W/ MICROALB/CREAT RATIO 7. Encounter for immunization Z23 V03.89 ADMIN INFLUENZA VIRUS VAC INFLUENZA VIRUS VAC QUAD,SPLIT,PRESV FREE SYRINGE IM  
8. Screening for depression Z13.89 V79.0 SC DEPRESSION SCREEN ANNUAL  
 
HM Colon cancer: colonoscopy due at age 48 Influenza vaccine: complete Pneumococcal vaccine: due for SOB Tdap: complete Herpes Zoster vaccine: due at age 61 Hep B vaccine: not indicated (liver dz, DM 19-59) Weight:  Body mass index is 44.29 kg/(m^2). Discussed the patient's BMI with her. The BMI follow up plan is as follows: Improve diet and 30 min of moderate activity at least 5 times a week Breast Cancer: mammogram due 11/2018 Osteoporosis: DEXA scan ordered by rheumatology Healthy lifestyle has been encouraged including avoidance of tobacco, limiting or avoiding alcohol intake, heart healthy diet which is low in cholesterol and saturated fat and contains fresh fruits, vegetables and whole grains and fiber, regular exercise with goals of 20-30 minutes 3-5 days weekly and maintaining an optimal BMI. Continue ketogenic/IF diet with exercise Depression screenin18 Follow-up Disposition: 
Return in about 3 months (around 2018) for Go over lab/imaging results, Follow up hyperlipidemia, Follow up weight, Follow up weight management. *Patient verbalized understanding and agreement with the plan. Patient was given an after-visit summary. Bessy Atkinson. Mamadou Celestin MD - Internal Medicine 2018, 3:36 PM 
[address] [address] [facility], [address] Phone [phone] Fax [phone]

## 2018-09-05 NOTE — MR AVS SNAPSHOT
[address] [place] [address] Yaron Vincent [address] 
[phone] Patient: Yvonne Padron MRN: [id number] Visit Information Date & Time Provider Department Dept. Phone Encounter #  
 9/5/2018  3:30 PM Dudley Hawley MD [facility] [phone] [id number] Follow-up Instructions Return in about 3 months (around 12/5/2018) for Well woman exam, 30 minutes, Go over lab/imaging results, Follow up hyperlipidemia, Follow up weight. Upcoming Health Maintenance Date Due  
 BREAST CANCER SCRN MAMMOGRAM 11/30/2018 PAP AKA CERVICAL CYTOLOGY 12/8/2018 LIPID PANEL Q1 11/28/2018 HEMOGLOBIN A1C Q6M 3/5/2019 DTaP/Tdap/Td series (2 - Td) 5/18/2026 Allergies as of 9/5/2018  Review Complete On: 9/5/2018 By: Dudley Hawley MD  
  
 Severity Noted Reaction Type Reactions Pork/porcine Containing Products  02/16/2016    Hives Current Immunizations  Reviewed on 12/31/2014 Name Date Influenza Vaccine 10/8/2013 Influenza Vaccine (Quad) PF 9/5/2018, 8/23/2017  2:24 PM, 8/25/2015  8:18 AM  
 TB Skin Test (PPD) Intradermal 2/19/2016  4:00 PM, 12/31/2014, 11/30/2007 12:00 AM  
 Tetanus Toxoid, Adsorbed 4/19/2012 Not reviewed this visit You Were Diagnosed With   
  
 Codes Comments Secondary diabetes ([facility] [address]    -  Primary ICD-10-CM: E13.9 ICD-9-CM: 249.00 Type 2 diabetes mellitus with complication, unspecified whether long term insulin use (HCC)     ICD-10-CM: E11.8 ICD-9-CM: 250.90 Encounter for immunization     ICD-10-CM: M78 ICD-9-CM: V03.89 Screening for depression     ICD-10-CM: Z13.89 ICD-9-CM: V79.0 Vitals BP Pulse Temp Resp Height(growth percentile) Weight(growth percentile) 130/80 (BP 1 Location: Left arm, BP Patient Position: Sitting) 91 98.1 °F (36.7 °C) (Oral) 18 5' 4\" (1.626 m) 258 lb (117 kg) SpO2 BMI OB Status Smoking Status 92% 44.29 kg/m2 Hysterectomy Former Smoker Vitals History BMI and BSA Data Body Mass Index Body Surface Area  
 44.29 kg/m 2 2.3 m 2 Preferred Pharmacy Pharmacy Name Phone [facility] [place], [address] [place] [address] [phone] Your Updated Medication List  
  
   
This list is accurate as of 9/5/18  4:19 PM.  Always use your most recent med list. amLODIPine 10 mg tablet Commonly known as:  Marie Darting Take 1 Tab by mouth daily. aspirin delayed-release 81 mg tablet Take  by mouth daily. atorvastatin 10 mg tablet Commonly known as:  LIPITOR Take 1 Tab by mouth daily. CALCIUM 600 + D 600-125 mg-unit Tab Generic drug:  calcium-cholecalciferol (d3) Take  by mouth.  
  
 ergocalciferol 50,000 unit capsule Commonly known as:  ERGOCALCIFEROL Take 1 Cap by mouth every seven (7) days. Indications: Vitamin D Deficiency  
  
 etodolac 400 mg tablet Commonly known as:  LODINE Take  by mouth two (2) times daily (with meals). ferrous sulfate 325 mg (65 mg iron) tablet Take by Mouth Once a Day. IMITREX 100 mg tablet Generic drug:  SUMAtriptan Take 100 mg by mouth once as needed for Migraine. ondansetron 4 mg disintegrating tablet Commonly known as:  ZOFRAN ODT Take 1 Tab by mouth every eight (8) hours as needed for Nausea. [address] 7 Take  by mouth. PEPCID 20 mg tablet Generic drug:  famotidine Take 20 mg by mouth two (2) times a day. PULMICORT FLEXHALER 180 mcg/actuation Aepb inhaler Generic drug:  budesonide Take  by inhalation. solifenacin 10 mg tablet Commonly known as:  VESIcare Take 1 Tab by mouth daily. topiramate 50 mg tablet Commonly known as:  TOPAMAX Take 50 mg by mouth two (2) times a day. 100 mg at hs  
  
 valsartan-hydroCHLOROthiazide 320-12.5 mg per tablet Commonly known as:  DIOVAN-HCT Take 1 Tab by mouth daily. VENTOLIN HFA 90 mcg/actuation inhaler Generic drug:  albuterol  
as needed. We Performed the Following ADMIN INFLUENZA VIRUS VAC [ [facility]] AMB POC HEMOGLOBIN A1C [06650 CPT(R)] INFLUENZA VIRUS VAC QUAD,SPLIT,PRESV FREE SYRINGE IM [id number] CPT(R)] [address] [employer] [ [facility]] Follow-up Instructions Return in about 3 months (around 12/5/2018) for Well woman exam, 30 minutes, Go over lab/imaging results, Follow up hyperlipidemia, Follow up weight. Patient Instructions Vaccine Information Statement Influenza (Flu) Vaccine (Inactivated or Recombinant): What you need to know Many Vaccine Information Statements are available in Czech and other languages. See www.immunize.org/vis Hojas de Información Sobre Vacunas están disponibles en Español y en muchos otros idiomas. Visite www.immunize.org/vis 1. Why get vaccinated? Influenza (flu) is a contagious disease that spreads around the [facility] every year, usually between October and May. Flu is caused by influenza viruses, and is spread mainly by coughing, sneezing, and close contact. Anyone can get flu. Flu strikes suddenly and can last several days. Symptoms vary by age, but can include: 
 fever/chills  sore throat  muscle aches  fatigue  cough  headache  runny or stuffy nose Flu can also lead to pneumonia and blood infections, and cause diarrhea and seizures in children. If you have a medical condition, such as heart or lung disease, flu can make it worse. Flu is more dangerous for some people. Infants and young children, people 72years of age and older, pregnant women, and people with certain health conditions or a weakened immune system are at greatest risk. Each year thousands of people in the [facility] die from flu, and many more are hospitalized. Flu vaccine can:  keep you from getting flu, 
 make flu less severe if you do get it, and 
 keep you from spreading flu to your family and other people. 2. Inactivated and recombinant flu vaccines A dose of flu vaccine is recommended every flu season. Children 6 months through 6years of age may need two doses during the same flu season. Everyone else needs only one dose each flu season. Some inactivated flu vaccines contain a very small amount of a mercury-based preservative called thimerosal. Studies have not shown thimerosal in vaccines to be harmful, but flu vaccines that do not contain thimerosal are available. There is no live flu virus in flu shots. They cannot cause the flu. There are many flu viruses, and they are always changing. Each year a new flu vaccine is made to protect against three or four viruses that are likely to cause disease in the upcoming flu season. But even when the vaccine doesnt exactly match these viruses, it may still provide some protection Flu vaccine cannot prevent: 
 flu that is caused by a virus not covered by the vaccine, or 
 illnesses that look like flu but are not. It takes about 2 weeks for protection to develop after vaccination, and protection lasts through the flu season. 3. Some people should not get this vaccine Tell the person who is giving you the vaccine:  If you have any severe, life-threatening allergies. If you ever had a life-threatening allergic reaction after a dose of flu vaccine, or have a severe allergy to any part of this vaccine, you may be advised not to get vaccinated. Most, but not all, types of flu vaccine contain a small amount of egg protein.  If you ever had Guillain-Barré Syndrome (also called GBS). Some people with a history of GBS should not get this vaccine. This should be discussed with your doctor.  If you are not feeling well. It is usually okay to get flu vaccine when you have a mild illness, but you might be asked to come back when you feel better. 4. Risks of a vaccine reaction With any medicine, including vaccines, there is a chance of reactions. These are usually mild and go away on their own, but serious reactions are also possible. Most people who get a flu shot do not have any problems with it. Minor problems following a flu shot include:  
 soreness, redness, or swelling where the shot was given  hoarseness  sore, red or itchy eyes  cough  fever  aches  headache  itching  fatigue If these problems occur, they usually begin soon after the shot and last 1 or 2 days. More serious problems following a flu shot can include the following:  There may be a small increased risk of Guillain-Barré Syndrome (GBS) after inactivated flu vaccine. This risk has been estimated at 1 or 2 additional cases per million people vaccinated. This is much lower than the risk of severe complications from flu, which can be prevented by flu vaccine.  Young children who get the flu shot along with pneumococcal vaccine (PCV13) and/or DTaP vaccine at the same time might be slightly more likely to have a seizure caused by fever. Ask your doctor for more information. Tell your doctor if a child who is getting flu vaccine has ever had a seizure. Problems that could happen after any injected vaccine:  People sometimes faint after a medical procedure, including vaccination. Sitting or lying down for about 15 minutes can help prevent fainting, and injuries caused by a fall. Tell your doctor if you feel dizzy, or have vision changes or ringing in the ears.  Some people get severe pain in the shoulder and have difficulty moving the arm where a shot was given. This happens very rarely.  Any medication can cause a severe allergic reaction.  Such reactions from a vaccine are very rare, estimated at about 1 in a million doses, and would happen within a few minutes to a few hours after the vaccination. As with any medicine, there is a very remote chance of a vaccine causing a serious injury or death. The safety of vaccines is always being monitored. For more information, visit: www.cdc.gov/vaccinesafety/ 
 
5. What if there is a serious reaction? What should I look for?  Look for anything that concerns you, such as signs of a severe allergic reaction, very high fever, or unusual behavior. Signs of a severe allergic reaction can include hives, swelling of the face and throat, difficulty breathing, a fast heartbeat, dizziness, and weakness  usually within a few minutes to a few hours after the vaccination. What should I do?  If you think it is a severe allergic reaction or other emergency that cant wait, call 9-1-1 and get the person to the nearest hospital. Otherwise, call your doctor.  Reactions should be reported to the Vaccine Adverse Event Reporting System (VAERS). Your doctor should file this report, or you can do it yourself through  the VAERS web site at www.vaers. [facility].gov, or by calling 0-595.934.5752. VAERS does not give medical advice. 6. The National Vaccine Injury Compensation Program 
 
The [facility] Vaccine Injury Compensation Program (VICP) is a federal program that was created to compensate people who may have been injured by certain vaccines. Persons who believe they may have been injured by a vaccine can learn about the program and about filing a claim by calling 4-544.608.7760 or visiting the [facility] [employer] website at www.[facility].gov/vaccinecompensation. There is a time limit to file a claim for compensation. 7. How can I learn more?  Ask your healthcare provider. He or she can give you the vaccine package insert or suggest other sources of information.  Call your local or state health department.  Contact the Centers for Disease Control and Prevention (CDC): 
- Call 8-795.261.2829 (1-800-CDC-INFO) or 
- Visit CDCs website at www.cdc.gov/flu Vaccine Information Statement Inactivated Influenza Vaccine 8/7/2015 
42 U. Thelda Cushing [id number] [facility] and [facility] Centers for Disease Control and Prevention Office Use Only Vaccine Information Statement Influenza (Flu) Vaccine (Inactivated or Recombinant): What you need to know Many Vaccine Information Statements are available in Vincentian and other languages. See www.immunize.org/vis Hojas de Información Sobre Vacunas están disponibles en Español y en muchos otros idiomas. Visite www.immunize.org/vis 1. Why get vaccinated? Influenza (flu) is a contagious disease that spreads around the [place] every year, usually between October and May. Flu is caused by influenza viruses, and is spread mainly by coughing, sneezing, and close contact. Anyone can get flu. Flu strikes suddenly and can last several days. Symptoms vary by age, but can include: 
 fever/chills  sore throat  muscle aches  fatigue  cough  headache  runny or stuffy nose Flu can also lead to pneumonia and blood infections, and cause diarrhea and seizures in children. If you have a medical condition, such as heart or lung disease, flu can make it worse. Flu is more dangerous for some people. Infants and young children, people 72years of age and older, pregnant women, and people with certain health conditions or a weakened immune system are at greatest risk. Each year thousands of people in the [facility] die from flu, and many more are hospitalized. Flu vaccine can: 
 keep you from getting flu, 
 make flu less severe if you do get it, and 
 keep you from spreading flu to your family and other people. 2. Inactivated and recombinant flu vaccines A dose of flu vaccine is recommended every flu season. Children 6 months through 6years of age may need two doses during the same flu season. Everyone else needs only one dose each flu season. Some inactivated flu vaccines contain a very small amount of a mercury-based preservative called thimerosal. Studies have not shown thimerosal in vaccines to be harmful, but flu vaccines that do not contain thimerosal are available. There is no live flu virus in flu shots. They cannot cause the flu. There are many flu viruses, and they are always changing. Each year a new flu vaccine is made to protect against three or four viruses that are likely to cause disease in the upcoming flu season. But even when the vaccine doesnt exactly match these viruses, it may still provide some protection Flu vaccine cannot prevent: 
 flu that is caused by a virus not covered by the vaccine, or 
 illnesses that look like flu but are not. It takes about 2 weeks for protection to develop after vaccination, and protection lasts through the flu season. 3. Some people should not get this vaccine Tell the person who is giving you the vaccine:  If you have any severe, life-threatening allergies. If you ever had a life-threatening allergic reaction after a dose of flu vaccine, or have a severe allergy to any part of this vaccine, you may be advised not to get vaccinated. Most, but not all, types of flu vaccine contain a small amount of egg protein.  If you ever had Guillain-Barré Syndrome (also called GBS). Some people with a history of GBS should not get this vaccine. This should be discussed with your doctor.  If you are not feeling well. It is usually okay to get flu vaccine when you have a mild illness, but you might be asked to come back when you feel better. 4. Risks of a vaccine reaction With any medicine, including vaccines, there is a chance of reactions. These are usually mild and go away on their own, but serious reactions are also possible. Most people who get a flu shot do not have any problems with it. Minor problems following a flu shot include:  
 soreness, redness, or swelling where the shot was given  hoarseness  sore, red or itchy eyes  cough  fever  aches  headache  itching  fatigue If these problems occur, they usually begin soon after the shot and last 1 or 2 days. More serious problems following a flu shot can include the following:  There may be a small increased risk of Guillain-Barré Syndrome (GBS) after inactivated flu vaccine. This risk has been estimated at 1 or 2 additional cases per million people vaccinated. This is much lower than the risk of severe complications from flu, which can be prevented by flu vaccine.  Young children who get the flu shot along with pneumococcal vaccine (PCV13) and/or DTaP vaccine at the same time might be slightly more likely to have a seizure caused by fever. Ask your doctor for more information. Tell your doctor if a child who is getting flu vaccine has ever had a seizure. Problems that could happen after any injected vaccine:  People sometimes faint after a medical procedure, including vaccination. Sitting or lying down for about 15 minutes can help prevent fainting, and injuries caused by a fall. Tell your doctor if you feel dizzy, or have vision changes or ringing in the ears.  Some people get severe pain in the shoulder and have difficulty moving the arm where a shot was given. This happens very rarely.  Any medication can cause a severe allergic reaction. Such reactions from a vaccine are very rare, estimated at about 1 in a million doses, and would happen within a few minutes to a few hours after the vaccination. As with any medicine, there is a very remote chance of a vaccine causing a serious injury or death. The safety of vaccines is always being monitored. For more information, visit: www.cdc.gov/vaccinesafety/ 
 
 
The [facility] Vaccine Injury Compensation Program (VICP) is a federal program that was created to compensate people who may have been injured by certain vaccines. Persons who believe they may have been injured by a vaccine can learn about the program and about filing a claim by calling 4-458.181.7307 or visiting the [employer] website at www.[facility].gov/vaccinecompensation. There is a time limit to file a claim for compensation. 7. How can I learn more?  Ask your healthcare provider. He or she can give you the vaccine package insert or suggest other sources of information.  Call your local or state health department.  Contact the Centers for Disease Control and Prevention (CDC): 
- Call 3-955.376.8636 (1-800-CDC-INFO) or 
- Visit CDCs website at www.cdc.gov/flu Vaccine Information Statement Inactivated Influenza Vaccine 8/7/2015 
42 KUNAL Tubbs [id number] Department of Health and [employer] Centers for Disease Control and Prevention Office Use Only [facility]! Dear Bhavya Shirley: Thank you for requesting a [employer] account. Our records indicate that you already have an active [employer] account. You can access your account anytime at [email or web address] Did you know that you can access your hospital and ER discharge instructions at any time in [employer]? You can also review all of your test results from your hospital stay or ER visit. Additional Information If you have questions, please visit the Frequently Asked Questions section of the [employer] website at [email or web address] Remember, [employer] is NOT to be used for urgent needs. For medical emergencies, dial 911. Now available from your iPhone and Android! Please provide this summary of care documentation to your next provider. Your primary care clinician is listed as Murali Vazquez. If you have any questions after today's visit, please call [phone].

## 2018-09-05 NOTE — PATIENT INSTRUCTIONS
Vaccine Information Statement Influenza (Flu) Vaccine (Inactivated or Recombinant): What you need to know Many Vaccine Information Statements are available in [place]ian and other languages. See www.immunize.org/vis Hojas de Información Sobre Vacunas están disponibles en Español y en muchos otros idiomas. Visite www.immunize.org/vis 1. Why get vaccinated? Influenza (flu) is a contagious disease that spreads around the [place] every year, usually between October and May. Flu is caused by influenza viruses, and is spread mainly by coughing, sneezing, and close contact. Anyone can get flu. Flu strikes suddenly and can last several days. Symptoms vary by age, but can include: 
 fever/chills  sore throat  muscle aches  fatigue  cough  headache  runny or stuffy nose Flu can also lead to pneumonia and blood infections, and cause diarrhea and seizures in children. If you have a medical condition, such as heart or lung disease, flu can make it worse. Flu is more dangerous for some people. Infants and young children, people 72years of age and older, pregnant women, and people with certain health conditions or a weakened immune system are at greatest risk. Each year thousands of people in the [facility] die from flu, and many more are hospitalized. Flu vaccine can: 
 keep you from getting flu, 
 make flu less severe if you do get it, and 
 keep you from spreading flu to your family and other people. 2. Inactivated and recombinant flu vaccines A dose of flu vaccine is recommended every flu season. Children 6 months through 6years of age may need two doses during the same flu season. Everyone else needs only one dose each flu season.   
 
 
Some inactivated flu vaccines contain a very small amount of a mercury-based preservative called thimerosal. Studies have not shown thimerosal in vaccines to be harmful, but flu vaccines that do not contain thimerosal are available. There is no live flu virus in flu shots. They cannot cause the flu. There are many flu viruses, and they are always changing. Each year a new flu vaccine is made to protect against three or four viruses that are likely to cause disease in the upcoming flu season. But even when the vaccine doesnt exactly match these viruses, it may still provide some protection Flu vaccine cannot prevent: 
 flu that is caused by a virus not covered by the vaccine, or 
 illnesses that look like flu but are not. It takes about 2 weeks for protection to develop after vaccination, and protection lasts through the flu season. 3. Some people should not get this vaccine Tell the person who is giving you the vaccine:  If you have any severe, life-threatening allergies. If you ever had a life-threatening allergic reaction after a dose of flu vaccine, or have a severe allergy to any part of this vaccine, you may be advised not to get vaccinated. Most, but not all, types of flu vaccine contain a small amount of egg protein.  If you ever had Guillain-Barré Syndrome (also called GBS). Some people with a history of GBS should not get this vaccine. This should be discussed with your doctor.  If you are not feeling well. It is usually okay to get flu vaccine when you have a mild illness, but you might be asked to come back when you feel better. 4. Risks of a vaccine reaction With any medicine, including vaccines, there is a chance of reactions. These are usually mild and go away on their own, but serious reactions are also possible. Most people who get a flu shot do not have any problems with it. Minor problems following a flu shot include:  
 soreness, redness, or swelling where the shot was given  hoarseness  sore, red or itchy eyes  cough  fever  aches  headache  itching  fatigue If these problems occur, they usually begin soon after the shot and last 1 or 2 days. More serious problems following a flu shot can include the following:  There may be a small increased risk of Guillain-Barré Syndrome (GBS) after inactivated flu vaccine. This risk has been estimated at 1 or 2 additional cases per million people vaccinated. This is much lower than the risk of severe complications from flu, which can be prevented by flu vaccine.  Young children who get the flu shot along with pneumococcal vaccine (PCV13) and/or DTaP vaccine at the same time might be slightly more likely to have a seizure caused by fever. Ask your doctor for more information. Tell your doctor if a child who is getting flu vaccine has ever had a seizure. Problems that could happen after any injected vaccine:  People sometimes faint after a medical procedure, including vaccination. Sitting or lying down for about 15 minutes can help prevent fainting, and injuries caused by a fall. Tell your doctor if you feel dizzy, or have vision changes or ringing in the ears.  Some people get severe pain in the shoulder and have difficulty moving the arm where a shot was given. This happens very rarely.  Any medication can cause a severe allergic reaction. Such reactions from a vaccine are very rare, estimated at about 1 in a million doses, and would happen within a few minutes to a few hours after the vaccination. As with any medicine, there is a very remote chance of a vaccine causing a serious injury or death. The safety of vaccines is always being monitored. For more information, visit: www.cdc.gov/vaccinesafety/ 
 
5. What if there is a serious reaction? What should I look for?  Look for anything that concerns you, such as signs of a severe allergic reaction, very high fever, or unusual behavior.  
 
Signs of a severe allergic reaction can include hives, swelling of the face and throat, difficulty breathing, a fast heartbeat, dizziness, and weakness  usually within a few minutes to a few hours after the vaccination. What should I do?  If you think it is a severe allergic reaction or other emergency that cant wait, call 9-1-1 and get the person to the nearest hospital. Otherwise, call your doctor.  Reactions should be reported to the Vaccine Adverse Event Reporting System (VAERS). Your doctor should file this report, or you can do it yourself through  the VAERS web site at www.vaers. [facility].gov, or by calling 8-619.821.5015. VAERS does not give medical advice. 6. The National Vaccine Injury Compensation Program 
 
The [facility] Vaccine Injury Compensation Program (VICP) is a federal program that was created to compensate people who may have been injured by certain vaccines. Persons who believe they may have been injured by a vaccine can learn about the program and about filing a claim by calling 6-605.596.9690 or visiting the [employer] website at www.[facility].gov/vaccinecompensation. There is a time limit to file a claim for compensation. 7. How can I learn more?  Ask your healthcare provider. He or she can give you the vaccine package insert or suggest other sources of information.  Call your local or state health department.  Contact the Centers for Disease Control and Prevention (CDC): 
- Call 0-714.507.2843 (1-800-CDC-INFO) or 
- Visit CDCs website at www.cdc.gov/flu Vaccine Information Statement Inactivated Influenza Vaccine 8/7/2015 
42 URoel Anthony [id number] Department of [facility] and [place] Centers for Disease Control and Prevention Office Use Only Vaccine Information Statement Influenza (Flu) Vaccine (Inactivated or Recombinant): What you need to know Many Vaccine Information Statements are available in Uzbek and other languages. See www.immunize.org/vis Hojas de Información Sobre Vacunas están disponibles en Español y en ardi santos. Visite www.immunize.org/vis 1. Why get vaccinated? Influenza (flu) is a contagious disease that spreads around the [place] every year, usually between October and May. Flu is caused by influenza viruses, and is spread mainly by coughing, sneezing, and close contact. Anyone can get flu. Flu strikes suddenly and can last several days. Symptoms vary by age, but can include: 
 fever/chills  sore throat  muscle aches  fatigue  cough  headache  runny or stuffy nose Flu can also lead to pneumonia and blood infections, and cause diarrhea and seizures in children. If you have a medical condition, such as heart or lung disease, flu can make it worse. Flu is more dangerous for some people. Infants and young children, people 72years of age and older, pregnant women, and people with certain health conditions or a weakened immune system are at greatest risk. Each year thousands of people in the [facility] die from flu, and many more are hospitalized. Flu vaccine can: 
 keep you from getting flu, 
 make flu less severe if you do get it, and 
 keep you from spreading flu to your family and other people. 2. Inactivated and recombinant flu vaccines A dose of flu vaccine is recommended every flu season. Children 6 months through 6years of age may need two doses during the same flu season. Everyone else needs only one dose each flu season. Some inactivated flu vaccines contain a very small amount of a mercury-based preservative called thimerosal. Studies have not shown thimerosal in vaccines to be harmful, but flu vaccines that do not contain thimerosal are available. There is no live flu virus in flu shots. They cannot cause the flu. There are many flu viruses, and they are always changing.  Each year a new flu vaccine is made to protect against three or four viruses that are likely to cause disease in the upcoming flu season. But even when the vaccine doesnt exactly match these viruses, it may still provide some protection Flu vaccine cannot prevent: 
 flu that is caused by a virus not covered by the vaccine, or 
 illnesses that look like flu but are not. It takes about 2 weeks for protection to develop after vaccination, and protection lasts through the flu season. 3. Some people should not get this vaccine Tell the person who is giving you the vaccine:  If you have any severe, life-threatening allergies. If you ever had a life-threatening allergic reaction after a dose of flu vaccine, or have a severe allergy to any part of this vaccine, you may be advised not to get vaccinated. Most, but not all, types of flu vaccine contain a small amount of egg protein.  If you ever had Guillain-Barré Syndrome (also called GBS). Some people with a history of GBS should not get this vaccine. This should be discussed with your doctor.  If you are not feeling well. It is usually okay to get flu vaccine when you have a mild illness, but you might be asked to come back when you feel better. 4. Risks of a vaccine reaction With any medicine, including vaccines, there is a chance of reactions. These are usually mild and go away on their own, but serious reactions are also possible. Most people who get a flu shot do not have any problems with it. Minor problems following a flu shot include:  
 soreness, redness, or swelling where the shot was given  hoarseness  sore, red or itchy eyes  cough  fever  aches  headache  itching  fatigue If these problems occur, they usually begin soon after the shot and last 1 or 2 days. More serious problems following a flu shot can include the following:  There may be a small increased risk of Guillain-Barré Syndrome (GBS) after inactivated flu vaccine.   This risk has been estimated at 1 or 2 additional cases per million people vaccinated. This is much lower than the risk of severe complications from flu, which can be prevented by flu vaccine.  Young children who get the flu shot along with pneumococcal vaccine (PCV13) and/or DTaP vaccine at the same time might be slightly more likely to have a seizure caused by fever. Ask your doctor for more information. Tell your doctor if a child who is getting flu vaccine has ever had a seizure. Problems that could happen after any injected vaccine:  People sometimes faint after a medical procedure, including vaccination. Sitting or lying down for about 15 minutes can help prevent fainting, and injuries caused by a fall. Tell your doctor if you feel dizzy, or have vision changes or ringing in the ears.  Some people get severe pain in the shoulder and have difficulty moving the arm where a shot was given. This happens very rarely.  Any medication can cause a severe allergic reaction. Such reactions from a vaccine are very rare, estimated at about 1 in a million doses, and would happen within a few minutes to a few hours after the vaccination. As with any medicine, there is a very remote chance of a vaccine causing a serious injury or death. The safety of vaccines is always being monitored. For more information, visit: www.cdc.gov/vaccinesafety/ 
 
5. What if there is a serious reaction? What should I look for?  Look for anything that concerns you, such as signs of a severe allergic reaction, very high fever, or unusual behavior. Signs of a severe allergic reaction can include hives, swelling of the face and throat, difficulty breathing, a fast heartbeat, dizziness, and weakness  usually within a few minutes to a few hours after the vaccination. What should I do?  
 
 If you think it is a severe allergic reaction or other emergency that cant wait, call 9-1-1 and get the person to the nearest hospital. Otherwise, call your doctor.  Reactions should be reported to the Vaccine Adverse Event Reporting System (VAERS). Your doctor should file this report, or you can do it yourself through  the VAERS web site at www.vaers. [facility].gov, or by calling 4-406.699.4606. VAERS does not give medical advice. 6. The National Vaccine Injury Compensation Program 
 
The [facility] Vaccine Injury Compensation Program (VICP) is a federal program that was created to compensate people who may have been injured by certain vaccines. Persons who believe they may have been injured by a vaccine can learn about the program and about filing a claim by calling 1-360.224.6339 or visiting the [employer] website at www.[facility].gov/vaccinecompensation. There is a time limit to file a claim for compensation. 7. How can I learn more?  Ask your healthcare provider. He or she can give you the vaccine package insert or suggest other sources of information.  Call your local or state health department.  Contact the Centers for Disease Control and Prevention (CDC): 
- Call 0-278.476.9908 (1-800-CDC-INFO) or 
- Visit CDCs website at www.cdc.gov/flu Vaccine Information Statement Inactivated Influenza Vaccine 8/7/2015 
42 KUNAL Manrique [id number] Department of [facility] and [place] Centers for Disease Control and Prevention Office Use Only

## 2018-09-05 NOTE — PROGRESS NOTES
Vikram Phillip is 52 y.o. female presents today for office visit for follow up for diabetes and hypertension. Pt stated that her blood sugar this morning was 74. 
 
1. Have you been to the ER, urgent care clinic since your last visit? Hospitalized since your last visit? NO 
2. Have you seen or consulted any other health care providers outside of the [address] [place] [address] since your last visit? Include any pap smears or colon screening. Pulmonary- 2/2018 Health Maintenance reviewed. Upcoming Appts 11/2018 Requested Prescriptions No prescriptions requested or ordered in this encounter Visit Vitals  BP (!) 138/94 (BP 1 Location: Left arm, BP Patient Position: Sitting) Comment (BP Patient Position): manual  
 Pulse 91  Temp 98.1 °F (36.7 °C) (Oral)  Resp 18  Ht 5' 4\" (1.626 m)  Wt 258 lb (117 kg)  SpO2 92%  BMI 44.29 kg/m2 Vikram Phillip is a 52 y.o. female who presents for routine immunizations. She denies any symptoms , reactions or allergies that would exclude them from being immunized today. Risks and adverse reactions were discussed and the VIS was given to them. All questions were addressed. She was observed for 10 min post injection. There were no reactions observed. Merline Eriksson, LPN Vikram Phillip is a 52 y.o. female who presents for routine immunizations. She denies any symptoms , reactions or allergies that would exclude them from being immunized today. Risks and adverse reactions were discussed and the VIS was given to them. All questions were addressed. She was observed for 10 min post injection. There were no reactions observed.  
 
Merline Eriksson, LPN

## 2018-09-06 LAB
CREATININE, URINE: 31 MG/DL
MICROALB/CREAT RATIO, 140286: NORMAL MCG/MG OF CREATININE (ref 0–30)
MICROALBUMIN,URINE RANDOM 140054: <12 MCG/ML (ref 0.1–17)

## 2019-09-11 ENCOUNTER — TELEPHONE (OUTPATIENT)
Dept: FAMILY MEDICINE CLINIC | Facility: CLINIC | Age: 49
End: 2019-09-11

## 2019-09-19 ENCOUNTER — OFFICE VISIT (OUTPATIENT)
Dept: FAMILY MEDICINE CLINIC | Facility: CLINIC | Age: 49
End: 2019-09-19

## 2019-09-19 VITALS
HEART RATE: 88 BPM | SYSTOLIC BLOOD PRESSURE: 142 MMHG | BODY MASS INDEX: 44.35 KG/M2 | RESPIRATION RATE: 17 BRPM | WEIGHT: 259.8 LBS | HEIGHT: 64 IN | TEMPERATURE: 98.1 F | DIASTOLIC BLOOD PRESSURE: 98 MMHG | OXYGEN SATURATION: 97 %

## 2019-09-19 DIAGNOSIS — I10 HTN (HYPERTENSION), BENIGN: Primary | ICD-10-CM

## 2019-09-19 DIAGNOSIS — Z13.31 SCREENING FOR DEPRESSION: ICD-10-CM

## 2019-09-19 DIAGNOSIS — E66.01 OBESITY, CLASS III, BMI 40-49.9 (MORBID OBESITY) (HCC): ICD-10-CM

## 2019-09-19 DIAGNOSIS — E78.00 PURE HYPERCHOLESTEROLEMIA: ICD-10-CM

## 2019-09-19 DIAGNOSIS — N63.10 BREAST MASS, RIGHT: ICD-10-CM

## 2019-09-19 DIAGNOSIS — Z91.89 AT RISK FOR DIABETES MELLITUS: ICD-10-CM

## 2019-09-19 DIAGNOSIS — E55.9 VITAMIN D DEFICIENCY: ICD-10-CM

## 2019-09-19 LAB — HBA1C MFR BLD HPLC: 5.3 %

## 2019-09-19 RX ORDER — VALSARTAN AND HYDROCHLOROTHIAZIDE 320; 12.5 MG/1; MG/1
1 TABLET, FILM COATED ORAL DAILY
Qty: 90 TAB | Refills: 1 | Status: CANCELLED | OUTPATIENT
Start: 2019-09-19

## 2019-09-19 RX ORDER — AMLODIPINE BESYLATE 10 MG/1
10 TABLET ORAL DAILY
Qty: 90 TAB | Refills: 3 | Status: CANCELLED | OUTPATIENT
Start: 2019-09-19

## 2019-09-19 NOTE — PROGRESS NOTES
Amarjit Anton is a 50 y.o.  female presents today for office visit for follow up. Pt would also like to discuss DM. Pt is not fasting. Pt is in Room # 3      1. Have you been to the ER, urgent care clinic since your last visit? Hospitalized since your last visit? No    2. Have you seen or consulted any other health care providers outside of the [address] [place] [address] since your last visit? Include any pap smears or colon screening. No    Upcoming Appts  none    Health Maintenance reviewed      VORB: No orders of the defined types were placed in this encounter.   John Cassidy, DAVIDN

## 2019-09-19 NOTE — PATIENT INSTRUCTIONS
High Blood Pressure: Care Instructions Overview It's normal for blood pressure to go up and down throughout the day. But if it stays up, you have high blood pressure. Another name for high blood pressure is hypertension. Despite what a lot of people think, high blood pressure usually doesn't cause headaches or make you feel dizzy or lightheaded. It usually has no symptoms. But it does increase your risk of stroke, heart attack, and other problems. You and your doctor will talk about your risks of these problems based on your blood pressure. Your doctor will give you a goal for your blood pressure. Your goal will be based on your health and your age. Lifestyle changes, such as eating healthy and being active, are always important to help lower blood pressure. You might also take medicine to reach your blood pressure goal. 
Follow-up care is a key part of your treatment and safety. Be sure to make and go to all appointments, and call your doctor if you are having problems. It's also a good idea to know your test results and keep a list of the medicines you take. How can you care for yourself at home? Medical treatment · If you stop taking your medicine, your blood pressure will go back up. You may take one or more types of medicine to lower your blood pressure. Be safe with medicines. Take your medicine exactly as prescribed. Call your doctor if you think you are having a problem with your medicine. · Talk to your doctor before you start taking aspirin every day. Aspirin can help certain people lower their risk of a heart attack or stroke. But taking aspirin isn't right for everyone, because it can cause serious bleeding. · See your doctor regularly. You may need to see the doctor more often at first or until your blood pressure comes down. · If you are taking blood pressure medicine, talk to your doctor before you take decongestants or anti-inflammatory medicine, such as ibuprofen. Some of these medicines can raise blood pressure. · Learn how to check your blood pressure at home. Lifestyle changes · Stay at a healthy weight. This is especially important if you put on weight around the waist. Losing even 10 pounds can help you lower your blood pressure. · If your doctor recommends it, get more exercise. Walking is a good choice. Bit by bit, increase the amount you walk every day. Try for at least 30 minutes on most days of the week. You also may want to swim, bike, or do other activities. · Avoid or limit alcohol. Talk to your doctor about whether you can drink any alcohol. · Try to limit how much sodium you eat to less than 2,300 milligrams (mg) a day. Your doctor may ask you to try to eat less than 1,500 mg a day. · Eat plenty of fruits (such as bananas and oranges), vegetables, legumes, whole grains, and low-fat dairy products. · Lower the amount of saturated fat in your diet. Saturated fat is found in animal products such as milk, cheese, and meat. Limiting these foods may help you lose weight and also lower your risk for heart disease. · Do not smoke. Smoking increases your risk for heart attack and stroke. If you need help quitting, talk to your doctor about stop-smoking programs and medicines. These can increase your chances of quitting for good. When should you call for help? Call 911 anytime you think you may need emergency care. This may mean having symptoms that suggest that your blood pressure is causing a serious heart or blood vessel problem. Your blood pressure may be over 180/120. 
 For example, call 911 if: 
  · You have symptoms of a heart attack. These may include: 
? Chest pain or pressure, or a strange feeling in the chest. 
? Sweating. ? Shortness of breath. ? Nausea or vomiting. ? Pain, pressure, or a strange feeling in the back, neck, jaw, or upper belly or in one or both shoulders or arms. ? Lightheadedness or sudden weakness. ? A fast or irregular heartbeat.  
  · You have symptoms of a stroke. These may include: 
? Sudden numbness, tingling, weakness, or loss of movement in your face, arm, or leg, especially on only one side of your body. ? Sudden vision changes. ? Sudden trouble speaking. ? Sudden confusion or trouble understanding simple statements. ? Sudden problems with walking or balance. ? A sudden, severe headache that is different from past headaches.  
  · You have severe back or belly pain.  
 Do not wait until your blood pressure comes down on its own. Get help right away. 
 Call your doctor now or seek immediate care if: 
  · Your blood pressure is much higher than normal (such as 180/120 or higher), but you don't have symptoms.  
  · You think high blood pressure is causing symptoms, such as: 
? Severe headache. 
? Blurry vision.  
 Watch closely for changes in your health, and be sure to contact your doctor if: 
  · Your blood pressure measures higher than your doctor recommends at least 2 times. That means the top number is higher or the bottom number is higher, or both.  
  · You think you may be having side effects from your blood pressure medicine. Where can you learn more? Go to http://jeanette-pedro.info/. Enter X329 in the search box to learn more about \"High Blood Pressure: Care Instructions. \" Current as of: July 22, 2018 Content Version: 12.1 © 9525-6177 [employer]. Care instructions adapted under license by [employer] (which disclaims liability or warranty for this information). If you have questions about a medical condition or this instruction, always ask your healthcare professional. [place] [address] any warranty or liability for your use of this information.

## 2019-09-19 NOTE — PROGRESS NOTES
Internal Medicine Progress Note    Today's Date:  2019   Patient:  Katy Solitario  Patient :  1970    Subjective:     Chief Complaint   Patient presents with    Hypertension    Breast Mass    Cholesterol Problem    Weight Management      Breast mass  This is a new problem. This is not at goal. Pt had a mammogram and breast biopsy done. Pathology is benign. However, pt states that she has been referred for a lumpectomy. Hyperlipidemia/Obesity class III  This is a chronic problem. This is not at goal. Pt was on atorvastatin. Pt gained weight since the last visit    Lab Results   Component Value Date/Time    Cholesterol, total 203 (H) 2017 09:00 AM    HDL Cholesterol 48 2017 09:00 AM    LDL, calculated 139 (H) 2017 09:00 AM    VLDL, calculated 17 2017 09:00 AM    Triglyceride 84 2017 09:00 AM     Hypertension   This is a chronic problem. BP is not at goal. Pt takes norvasc and valsartan/HCTZ. Pt reports compliance with these medications. 3 most recent PHQ Screens 2019   Little interest or pleasure in doing things Not at all   Feeling down, depressed, irritable, or hopeless Not at all   Total Score PHQ 2 0     Past Medical History:   Diagnosis Date    Acute sinusitis 2015    Anemia     At risk for diabetes mellitus 2019    Chronic pain     Diabetes ([address]     Headache     HTN (hypertension), benign 2015    Hypertension     Obesity, Class II, BMI 35-39.9 2016    Obesity, Class III, BMI 40-49.9 (morbid obesity) ([address] 2015    Pure hypercholesterolemia 2016    Secondary diabetes ([address] 2015    Viral pharyngitis 2016    Vitamin D deficiency 2016     Past Surgical History:   Procedure Laterality Date    HX HYSTERECTOMY  10/20/2013    HX ORTHOPAEDIC  2012    left foot fusion      reports that she quit smoking about 33 years ago. She has a 5.00 pack-year smoking history.  She has never used smokeless tobacco. She reports that she drinks about 14.0 standard drinks of alcohol per week. She reports that she does not use drugs. Family History   Problem Relation Age of Onset    Asthma Mother     Heart Disease Father         arhythmia    Stroke Father 48        TIA    Alcohol abuse Father     Heart Disease Paternal Grandmother      Allergies   Allergen Reactions    Pork/Porcine Containing Products Hives     Review of Systems   CV:      chest pain, palpitations  PULM:  SOB, wheezing, cough, sputum production    Current Outpatient Meds     Current Outpatient Medications on File Prior to Visit   Medication Sig Dispense Refill    amLODIPine (NORVASC) 10 mg tablet Take 1 Tab by mouth daily. 90 Tab 3    valsartan-hydroCHLOROthiazide (DIOVAN-HCT) 320-12.5 mg per tablet Take 1 Tab by mouth daily. 90 Tab 1    topiramate (TOPAMAX) 50 mg tablet Take 50 mg by mouth two (2) times a day. 100 mg at hs       No current facility-administered medications on file prior to visit. Objective:     Visit Vitals  BP (!) 142/98 (BP 1 Location: Left arm, BP Patient Position: Sitting)   Pulse 88   Temp 98.1 °F (36.7 °C) (Oral)   Resp 17   Ht 5' 4\" (1.626 m)   Wt 259 lb 12.8 oz (117.8 kg)   SpO2 97%   BMI 44.59 kg/m²     General:   Well-nourished, well-groomed, pleasant, alert, in no acute distress  Head:  Normocephalic, atraumatic  Ears:  External ears WNL  Nose:  External nares WNL  Psych:  No pressured speech, no abnormal thought content    Lab Results   Component Value Date/Time    Creatinine 0.8 11/28/2017 09:00 AM    BUN 8 11/28/2017 09:00 AM    Sodium 142 11/28/2017 09:00 AM    Potassium 4.1 11/28/2017 09:00 AM    Chloride 103 11/28/2017 09:00 AM    CO2 26 11/28/2017 09:00 AM     Assessment/Plan & Orders:         ICD-10-CM ICD-9-CM    1. HTN (hypertension), benign I10 401.1 AMB POC HEMOGLOBIN A1C      CBC WITH AUTOMATED DIFF   2.  Breast mass, right N63.10 611.72 REFERRAL TO BREAST SURGERY   3. Pure hypercholesterolemia E78.00 272.0 LIPID PANEL      METABOLIC PANEL, COMPREHENSIVE   4. Vitamin D deficiency E55.9 268.9 VITAMIN D, 25 HYDROXY   5. At risk for diabetes mellitus Z91.89 V49.89 HEMOGLOBIN A1C WITH EAG   6. Obesity, Class III, BMI 40-49.9 (morbid obesity) ([facility]) E66.01 278.01    7. Screening for depression Z13.31 V79.0 [id number] [employer]      Follow up with rheumatology and pulmonary  Meds will be refilled upon review of labs    Follow-up and Dispositions    · Return in about 4 weeks (around 10/17/2019) for Blood pressure check, Weight management, Hypertension, Go over lab/imaging results, Prediabetes. *Patient verbalized understanding and agreement with the plan. Patient was given an after-visit summary. Maria G Sims. MD [facility] - Internal Medicine  9/19/2019, 8:00 AM  [facility]  [address] [place], [address]  Phone [phone]  Fax [phone]    Diagnoses and all orders for this visit:    1. HTN (hypertension), benign  -     AMB POC HEMOGLOBIN A1C  -     CBC WITH AUTOMATED DIFF; Future    2. Breast mass, right  -     REFERRAL TO BREAST SURGERY    3. Pure hypercholesterolemia  -     LIPID PANEL; Future  -     METABOLIC PANEL, COMPREHENSIVE; Future    4. Vitamin D deficiency  -     VITAMIN D, 25 HYDROXY; Future    5. At risk for diabetes mellitus  -     HEMOGLOBIN A1C WITH EAG; Future    6. Obesity, Class III, BMI 40-49.9 (morbid obesity) ([facility]    7.  Screening for depression  -     [id number] [address]

## 2019-09-21 LAB
CREATININE, EXTERNAL: 0.8
HBA1C MFR BLD HPLC: 5.7 %
LDL-C, EXTERNAL: 158

## 2019-10-07 ENCOUNTER — PATIENT MESSAGE (OUTPATIENT)
Dept: FAMILY MEDICINE CLINIC | Facility: CLINIC | Age: 49
End: 2019-10-07

## 2019-10-24 ENCOUNTER — OFFICE VISIT (OUTPATIENT)
Dept: FAMILY MEDICINE CLINIC | Facility: CLINIC | Age: 49
End: 2019-10-24

## 2019-10-24 VITALS
WEIGHT: 253.4 LBS | OXYGEN SATURATION: 98 % | RESPIRATION RATE: 16 BRPM | TEMPERATURE: 98.5 F | SYSTOLIC BLOOD PRESSURE: 130 MMHG | HEART RATE: 94 BPM | BODY MASS INDEX: 43.26 KG/M2 | DIASTOLIC BLOOD PRESSURE: 80 MMHG | HEIGHT: 64 IN

## 2019-10-24 DIAGNOSIS — Z23 ENCOUNTER FOR IMMUNIZATION: ICD-10-CM

## 2019-10-24 DIAGNOSIS — I10 HTN (HYPERTENSION), BENIGN: Primary | ICD-10-CM

## 2019-10-24 DIAGNOSIS — R11.0 NAUSEA: ICD-10-CM

## 2019-10-24 DIAGNOSIS — E78.00 PURE HYPERCHOLESTEROLEMIA: ICD-10-CM

## 2019-10-24 DIAGNOSIS — E66.01 OBESITY, CLASS III, BMI 40-49.9 (MORBID OBESITY) (HCC): ICD-10-CM

## 2019-10-24 DIAGNOSIS — E55.9 VITAMIN D DEFICIENCY: ICD-10-CM

## 2019-10-24 DIAGNOSIS — D86.82: ICD-10-CM

## 2019-10-24 NOTE — PATIENT INSTRUCTIONS
Nausea and Vomiting: Care Instructions  Your Care Instructions    When you are nauseated, you may feel weak and sweaty and notice a lot of saliva in your mouth. Nausea often leads to vomiting. Most of the time you do not need to worry about nausea and vomiting, but they can be signs of other illnesses. Two common causes of nausea and vomiting are stomach flu and food poisoning. Nausea and vomiting from viral stomach flu will usually start to improve within 24 hours. Nausea and vomiting from food poisoning may last from 12 to 48 hours. The doctor has checked you carefully, but problems can develop later. If you notice any problems or new symptoms, get medical treatment right away. Follow-up care is a key part of your treatment and safety. Be sure to make and go to all appointments, and call your doctor if you are having problems. It's also a good idea to know your test results and keep a list of the medicines you take. How can you care for yourself at home? · To prevent dehydration, drink plenty of fluids, enough so that your urine is light yellow or clear like water. Choose water and other caffeine-free clear liquids until you feel better. If you have kidney, heart, or liver disease and have to limit fluids, talk with your doctor before you increase the amount of fluids you drink. · Rest in bed until you feel better. · When you are able to eat, try clear soups, mild foods, and liquids until all symptoms are gone for 12 to 48 hours. Other good choices include dry toast, crackers, cooked cereal, and gelatin dessert, such as Jell-O. When should you call for help? Call 911 anytime you think you may need emergency care. For example, call if:    · You passed out (lost consciousness).    Call your doctor now or seek immediate medical care if:    · You have symptoms of dehydration, such as:  ? Dry eyes and a dry mouth. ? Passing only a little dark urine. ?  Feeling thirstier than usual.     · You have new or worsening belly pain.     · You have a new or higher fever.     · You vomit blood or what looks like coffee grounds.    Watch closely for changes in your health, and be sure to contact your doctor if:    · You have ongoing nausea and vomiting.     · Your vomiting is getting worse.     · Your vomiting lasts longer than 2 days.     · You are not getting better as expected. Where can you learn more? Go to http://jeanette-pedro.info/. Enter 25 [id number] in the search box to learn more about \"Nausea and Vomiting: Care Instructions. \"  Current as of: June 26, 2019  Content Version: 12.2  © 1245-5848 [employer], [employer]. Care instructions adapted under license by [employer] (which disclaims liability or warranty for this information). If you have questions about a medical condition or this instruction, always ask your healthcare professional. [address] any warranty or liability for your use of this information.

## 2019-10-24 NOTE — PROGRESS NOTES
Aracelis Rojo is a 50 y.o.  female presents today for office visit for follow up. Pt would also like to discuss HTN. Pt is not fasting. Pt is in Room # 3      1. Have you been to the ER, urgent care clinic since your last visit? Hospitalized since your last visit? No    2. Have you seen or consulted any other health care providers outside of the [address] [place] [address] since your last visit? Include any pap smears or colon screening. No    Upcoming Appts  none    Health Maintenance reviewed       VORB: No orders of the defined types were placed in this encounter. Alex Vicente LPN         Aracelis Rojo is a 50 y.o. female who presents for routine immunizations. She denies any symptoms , reactions or allergies that would exclude them from being immunized today. Risks and adverse reactions were discussed and the VIS was given to them. All questions were addressed. She was observed for 10 min post injection. There were no reactions observed.     Jose Salmon LPN

## 2019-10-25 RX ORDER — MULTIVITAMIN
1 TABLET ORAL 2 TIMES DAILY
Qty: 180 TAB | Refills: 3 | Status: SHIPPED | OUTPATIENT
Start: 2019-10-25 | End: 2020-11-23 | Stop reason: SDUPTHER

## 2019-10-25 NOTE — PROGRESS NOTES
Internal Medicine Progress Note    Today's Date:  10/25/2019   Patient:  Brooke Charlton  Patient :  1970    Subjective:     Chief Complaint   Patient presents with    Hypertension    Blood Pressure Check    Weight Management    Other     preDM    Immunization/Injection     flu      Nausea  This is a chronic problem, new to me This is not at goal. Pt reports taking zofran for this. Pt does not have a gastroenterologist.    Hyperlipidemia/Obesity class III/Prediabetes  This is a chronic problem. LDL and weight are not at goal. Pt was on atorvastatin. Pt lost weight since the last visit    Hypertension   This is a chronic problem. BP is not at goal. Pt takes norvasc. Pt reports compliance with these medications. Sarcoidosis  This is a chronic problem. This is at goal. Pt takes no medication for this. This was managed by pulmonary and rheumatology. 3 most recent PHQ Screens 2019   Little interest or pleasure in doing things Not at all   Feeling down, depressed, irritable, or hopeless Not at all   Total Score PHQ 2 0     Past Medical History:   Diagnosis Date    Acute sinusitis 2015    Anemia     At risk for diabetes mellitus 2019    Chronic pain     Diabetes ([address]     Headache     HTN (hypertension), benign 2015    Hypertension     Obesity, Class II, BMI 35-39.9 2016    Obesity, Class III, BMI 40-49.9 (morbid obesity) ([address] 2015    Pure hypercholesterolemia 2016    Secondary diabetes ([address] 2015    Viral pharyngitis 2016    Vitamin D deficiency 2016     Past Surgical History:   Procedure Laterality Date    HX HYSTERECTOMY  10/20/2013    HX ORTHOPAEDIC  2012    left foot fusion      reports that she quit smoking about 33 years ago. She has a 5.00 pack-year smoking history. She has never used smokeless tobacco. She reports that she drinks about 14.0 standard drinks of alcohol per week.  She reports that she does not use drugs. Family History   Problem Relation Age of Onset    Asthma Mother     Heart Disease Father         arhythmia    Stroke Father 48        TIA    Alcohol abuse Father     Heart Disease Paternal Grandmother      Allergies   Allergen Reactions    Pork/Porcine Containing Products Hives     Review of Systems   CV:      chest pain, palpitations  PULM:  SOB, wheezing, cough, sputum production    Current Outpatient Meds     Current Outpatient Medications on File Prior to Visit   Medication Sig Dispense Refill    amLODIPine (NORVASC) 10 mg tablet Take 1 Tab by mouth daily. 90 Tab 3    topiramate (TOPAMAX) 50 mg tablet Take 50 mg by mouth two (2) times a day. 100 mg at hs       No current facility-administered medications on file prior to visit. Objective:       Visit Vitals  /80 (BP 1 Location: Left arm, BP Patient Position: Sitting)   Pulse 94   Temp 98.5 °F (36.9 °C) (Oral)   Resp 16   Ht 5' 4\" (1.626 m)   Wt 253 lb 6.4 oz (114.9 kg)   SpO2 98%   BMI 43.50 kg/m²     General:   Well-nourished, well-groomed, pleasant, alert, in no acute distress  Head:  Normocephalic, atraumatic  Ears:  External ears WNL  Nose:  External nares WNL  Psych:  No pressured speech, no abnormal thought content    Lab Results   Component Value Date/Time    Creatinine 0.8 11/28/2017 09:00 AM    BUN 8 11/28/2017 09:00 AM    Sodium 142 11/28/2017 09:00 AM    Potassium 4.1 11/28/2017 09:00 AM    Chloride 103 11/28/2017 09:00 AM    CO2 26 11/28/2017 09:00 AM     Assessment/Plan & Orders:         ICD-10-CM ICD-9-CM    1. HTN (hypertension), benign I10 401.1    2. Nausea R11.0 787.02 REFERRAL TO GASTROENTEROLOGY   3. Vitamin D deficiency E55.9 268.9 calcium-cholecalciferol, D3, (CALCIUM 600 + D) tablet   4. Obesity, Class III, BMI 40-49.9 (morbid obesity) ([facility]) E66.01 278.01    5. Pure hypercholesterolemia E78.00 272.0    6. 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  Encounter for immunization Z23 V03.89 INFLUENZA VIRUS VAC QUAD,SPLIT,PRESV FREE SYRINGE IM      Follow up with rheumatology and pulmonary  Diet and exercise    Follow-up and Dispositions    · Return in about 3 months (around 1/24/2020) for Weight management, Hyperlipidemia, Hypertension. *Patient verbalized understanding and agreement with the plan. Patient was given an after-visit summary. Levi Srinivasan MD - Internal Medicine  10/25/2019, 8:00 AM  [facility]  [address] [place], [address]  Phone [phone]  Fax [phone]    Diagnoses and all orders for this visit:    1. HTN (hypertension), benign    2. Nausea  -     REFERRAL TO GASTROENTEROLOGY    3. Vitamin D deficiency  -     calcium-cholecalciferol, D3, (CALCIUM 600 + D) tablet; Take 1 Tab by mouth two (2) times a day. 4. Obesity, Class III, BMI 40-49.9 (morbid obesity) ([facility]    5. Pure hypercholesterolemia    6. 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  Encounter for immunization  -     INFLUENZA VIRUS VAC QUAD,SPLIT,PRESV FREE SYRINGE IM

## 2020-01-22 ENCOUNTER — OFFICE VISIT (OUTPATIENT)
Dept: FAMILY MEDICINE CLINIC | Facility: CLINIC | Age: 50
End: 2020-01-22

## 2020-01-22 VITALS
SYSTOLIC BLOOD PRESSURE: 120 MMHG | RESPIRATION RATE: 17 BRPM | HEART RATE: 80 BPM | HEIGHT: 64 IN | OXYGEN SATURATION: 100 % | TEMPERATURE: 97.6 F | WEIGHT: 225.8 LBS | DIASTOLIC BLOOD PRESSURE: 80 MMHG | BODY MASS INDEX: 38.55 KG/M2

## 2020-01-22 DIAGNOSIS — Z13.31 SCREENING FOR DEPRESSION: ICD-10-CM

## 2020-01-22 DIAGNOSIS — Z00.00 ROUTINE GENERAL MEDICAL EXAMINATION AT A HEALTH CARE FACILITY: Primary | ICD-10-CM

## 2020-01-22 DIAGNOSIS — Z23 ENCOUNTER FOR IMMUNIZATION: ICD-10-CM

## 2020-01-22 PROBLEM — M85.80 OSTEOPENIA: Status: ACTIVE | Noted: 2020-01-22

## 2020-01-22 PROBLEM — J45.20 MILD INTERMITTENT ASTHMA WITHOUT COMPLICATION: Status: ACTIVE | Noted: 2020-01-22

## 2020-01-22 RX ORDER — TAMOXIFEN CITRATE 20 MG/1
TABLET ORAL
COMMUNITY
Start: 2020-01-03

## 2020-01-22 NOTE — PROGRESS NOTES
Almas Ramey is a 52 y.o.  female presents today for office visit for follow up. Pt would also like to discuss HTN. Pt is  fasting. Pt is in Room # 3      1. Have you been to the ER, urgent care clinic since your last visit? Hospitalized since your last visit? No    2. Have you seen or consulted any other health care providers outside of the [address] [place] [address] since your last visit? Include any pap smears or colon screening. No    Upcoming Appts  none    Health Maintenance reviewed       VORB: No orders of the defined types were placed in this encounter.   Mik Kathleen LPN

## 2020-01-23 LAB
CHOLEST SERPL-MCNC: 164 MG/DL (ref 110–200)
HDLC SERPL-MCNC: 3.9 MG/DL (ref 0–5)
HDLC SERPL-MCNC: 42 MG/DL
LDL/HDL RATIO,LDHD: 2.5
LDLC SERPL CALC-MCNC: 106 MG/DL (ref 50–99)
NON-HDL CHOLESTEROL, 011976: 122 MG/DL
TRIGL SERPL-MCNC: 81 MG/DL (ref 40–149)
VLDLC SERPL CALC-MCNC: 16 MG/DL (ref 8–30)

## 2020-10-30 DIAGNOSIS — E55.9 VITAMIN D DEFICIENCY: ICD-10-CM

## 2020-10-30 DIAGNOSIS — E66.01 MORBID OBESITY (HCC): Primary | ICD-10-CM

## 2020-11-13 ENCOUNTER — PATIENT MESSAGE (OUTPATIENT)
Dept: FAMILY MEDICINE CLINIC | Age: 50
End: 2020-11-13

## 2020-11-13 RX ORDER — MULTIVITAMIN
TABLET ORAL
Qty: 180 TAB | Refills: 3 | OUTPATIENT
Start: 2020-11-13

## 2020-11-23 DIAGNOSIS — E55.9 VITAMIN D DEFICIENCY: ICD-10-CM

## 2020-11-23 RX ORDER — MULTIVITAMIN
1 TABLET ORAL 2 TIMES DAILY
Qty: 180 TAB | Refills: 0 | Status: SHIPPED | OUTPATIENT
Start: 2020-11-23 | End: 2021-03-26 | Stop reason: SDUPTHER

## 2020-11-24 ENCOUNTER — DOCUMENTATION ONLY (OUTPATIENT)
Dept: INTERNAL MEDICINE CLINIC | Age: 50
End: 2020-11-24

## 2020-11-24 NOTE — PROGRESS NOTES
Ms. Estil Soulier came into the office today to have her labs drawn. We did not have an order for labs. Please place order for labs.

## 2020-11-27 ENCOUNTER — HOSPITAL ENCOUNTER (OUTPATIENT)
Dept: LAB | Age: 50
Discharge: HOME | End: 2020-11-27

## 2020-11-27 LAB — SENTARA SPECIMEN COL,SENBCF: NORMAL

## 2020-11-27 PROCEDURE — 99001 SPECIMEN HANDLING PT-LAB: CPT

## 2020-11-28 LAB
A-G RATIO,AGRAT: 2 RATIO (ref 1.1–2.6)
ABSOLUTE LYMPHOCYTE COUNT, 10803: 2.2 K/UL (ref 1–4.8)
ALBUMIN SERPL-MCNC: 4.1 G/DL (ref 3.5–5)
ALP SERPL-CCNC: 61 U/L (ref 25–115)
ALT SERPL-CCNC: 26 U/L (ref 5–40)
ANION GAP SERPL CALC-SCNC: 11 MMOL/L (ref 3–15)
AST SERPL W P-5'-P-CCNC: 31 U/L (ref 10–37)
AVG GLU, 10930: 117 MG/DL (ref 91–123)
BASOPHILS # BLD: 0 K/UL (ref 0–0.2)
BASOPHILS NFR BLD: 0 % (ref 0–2)
BILIRUB SERPL-MCNC: 0.4 MG/DL (ref 0.2–1.2)
BUN SERPL-MCNC: 14 MG/DL (ref 6–22)
CALCIUM SERPL-MCNC: 9.1 MG/DL (ref 8.4–10.5)
CHLORIDE SERPL-SCNC: 110 MMOL/L (ref 98–110)
CHOLEST SERPL-MCNC: 163 MG/DL (ref 110–200)
CO2 SERPL-SCNC: 23 MMOL/L (ref 20–32)
CREAT SERPL-MCNC: 0.9 MG/DL (ref 0.5–1.2)
EOSINOPHIL # BLD: 0.3 K/UL (ref 0–0.5)
EOSINOPHIL NFR BLD: 4 % (ref 0–6)
ERYTHROCYTE [DISTWIDTH] IN BLOOD BY AUTOMATED COUNT: 13.8 % (ref 10–15.5)
GFRAA, 66117: >60
GFRNA, 66118: >60
GLOBULIN,GLOB: 2.1 G/DL (ref 2–4)
GLUCOSE SERPL-MCNC: 107 MG/DL (ref 70–99)
GRANULOCYTES,GRANS: 57 % (ref 40–75)
HBA1C MFR BLD HPLC: 5.7 % (ref 4.8–5.6)
HCT VFR BLD AUTO: 36.7 % (ref 35.1–48)
HDLC SERPL-MCNC: 3 MG/DL (ref 0–5)
HDLC SERPL-MCNC: 55 MG/DL
HGB BLD-MCNC: 11.2 G/DL (ref 11.7–16)
LDL/HDL RATIO,LDHD: 1.8
LDLC SERPL CALC-MCNC: 97 MG/DL (ref 50–99)
LYMPHOCYTES, LYMLT: 31 % (ref 20–45)
MCH RBC QN AUTO: 27 PG (ref 26–34)
MCHC RBC AUTO-ENTMCNC: 31 G/DL (ref 31–36)
MCV RBC AUTO: 88 FL (ref 81–99)
MONOCYTES # BLD: 0.5 K/UL (ref 0.1–1)
MONOCYTES NFR BLD: 7 % (ref 3–12)
NEUTROPHILS # BLD AUTO: 4.1 K/UL (ref 1.8–7.7)
NON-HDL CHOLESTEROL, 011976: 108 MG/DL
PLATELET # BLD AUTO: 181 K/UL (ref 140–440)
PMV BLD AUTO: 10.8 FL (ref 9–13)
POTASSIUM SERPL-SCNC: 4.3 MMOL/L (ref 3.5–5.5)
PROT SERPL-MCNC: 6.2 G/DL (ref 6.4–8.3)
RBC # BLD AUTO: 4.17 M/UL (ref 3.8–5.2)
SODIUM SERPL-SCNC: 144 MMOL/L (ref 133–145)
TRIGL SERPL-MCNC: 58 MG/DL (ref 40–149)
VLDLC SERPL CALC-MCNC: 12 MG/DL (ref 8–30)
WBC # BLD AUTO: 7.1 K/UL (ref 4–11)

## 2020-12-01 ENCOUNTER — VIRTUAL VISIT (OUTPATIENT)
Dept: FAMILY MEDICINE CLINIC | Age: 50
End: 2020-12-01
Payer: COMMERCIAL

## 2020-12-01 VITALS
DIASTOLIC BLOOD PRESSURE: 70 MMHG | WEIGHT: 192 LBS | SYSTOLIC BLOOD PRESSURE: 128 MMHG | HEIGHT: 64 IN | BODY MASS INDEX: 32.78 KG/M2

## 2020-12-01 DIAGNOSIS — I10 HTN (HYPERTENSION), BENIGN: Primary | ICD-10-CM

## 2020-12-01 DIAGNOSIS — E66.9 OBESITY, CLASS I, BMI 30-34.9: ICD-10-CM

## 2020-12-01 DIAGNOSIS — Z12.11 SCREEN FOR COLON CANCER: ICD-10-CM

## 2020-12-01 DIAGNOSIS — Z91.89 AT RISK FOR DIABETES MELLITUS: ICD-10-CM

## 2020-12-01 PROCEDURE — 99214 OFFICE O/P EST MOD 30 MIN: CPT | Performed by: INTERNAL MEDICINE

## 2020-12-01 NOTE — PROGRESS NOTES
Deandre Jennings is a 48 y.o. female who was seen by synchronous (real-time) audio-video technology on 12/1/2020. Consent:  She and/or her healthcare decision maker is aware that this patient-initiated Telehealth encounter is a billable service, with coverage as determined by her insurance carrier. She is aware that she may receive a bill and has provided verbal consent to proceed: Yes    I was at home while conducting this encounter. Assessment & Plan:   Diagnoses and all orders for this visit:    1. HTN (hypertension), benign    2. At risk for diabetes mellitus    3. Obesity, Class I, BMI 30-34.9    4. Screen for colon cancer  -     REFERRAL FOR COLONOSCOPY    Recommend shingles vaccine. Pt can get in the office    Follow-up and Dispositions    · Return in about 6 weeks (around 1/12/2021) for Go over labs/imaging, CPE. Subjective:   Deandre Jennings was seen for Blood sugar problem; Hypertension; Weight Management; and Results     Obesity class III/Prediabetes  This is a chronic problem. Blood sugar and weight are not at goal. Pt was on atorvastatin. Pt lost weight since the last visit     Hypertension   This is a chronic problem. BP is at goal. Pt takes amlodipine. Pt reports compliance with this medication.       Sarcoidosis  This is a chronic problem. This is at goal. Pt takes no medication for this. This is managed rheumatology.     3 most recent [facility] 36 Screens 1/22/2020   Little interest or pleasure in doing things Not at all   Feeling down, depressed, irritable, or hopeless Not at all   Total Score PHQ 2 0      Prior to Admission medications    Medication Sig Start Date End Date Taking? Authorizing Provider   calcium-cholecalciferol, D3, (Calcium 600 + D) tablet Take 1 Tab by mouth two (2) times a day. 11/23/20  Yes Ever MD Mahin   tamoxifen (NOLVADEX) 20 mg tablet  1/3/20  Yes Provider, Historical   amLODIPine (NORVASC) 10 mg tablet Take 1 Tab by mouth daily.  2/2/18  Yes Ever MD Mahin topiramate (TOPAMAX) 50 mg tablet Take 50 mg by mouth two (2) times a day. 100 mg at hs   Yes Provider, Historical     Allergies   Allergen Reactions    Pork/Porcine Containing Products Hives     ROS  Cardiac: no chest pain or palpitations  Respiratory: no shortness of breath or cough     PHYSICAL EXAMINATION:  [ INSTRUCTIONS:  \"[x]\" Indicates a positive item  \"[]\" Indicates a negative item  -- DELETE ALL ITEMS NOT EXAMINED]  Vital Signs: (As obtained by patient/caregiver at home)  Visit Vitals  /70   Ht 5' 4\" (1.626 m)   Wt 192 lb (87.1 kg)   BMI 32.96 kg/m²        Constitutional: [x] Appears well-developed and well-nourished [x] No apparent distress    Mental status: [x] Alert and awake  [x] Oriented to person/place/time [x] Able to follow commands   Eyes:   Sclera  [x]  Normal   Discharge [x]  None visible    HENT: [x] Normocephalic, atraumatic  Neurological:        [x] No Facial Asymmetry   [x] No gaze palsy  Psychiatric:       [x] Normal Affect []      [x] No Hallucinations     We discussed the expected course, resolution and complications of the diagnosis(es) in detail. Medication risks, benefits, costs, interactions, and alternatives were discussed as indicated. I advised her to contact the office if her condition worsens, changes or fails to improve as anticipated. She expressed understanding with the diagnosis(es) and plan. Pursuant to the emergency declaration under the [facility], [facility], [facility] waiver authority and the [employer] General Act, this Virtual  Visit was conducted, with patient's consent, to reduce the patient's risk of exposure to COVID-19 and provide continuity of care for an established patient. Services were provided through a video synchronous discussion virtually to substitute for in-person clinic visit.     Carolina Veronica MD  Internal Medicine  12/1/2020, 12:35 PM  [facility] [address] [facility] [address], [address]  Phone [phone]  Fax [phone]

## 2020-12-01 NOTE — PROGRESS NOTES
Charanjit Krishnan is a 48 y.o.  female presents today for office visit for follow up. Pt would also like to discuss HTN    1. Have you been to the ER, urgent care clinic since your last visit? Hospitalized since your last visit? No    2. Have you seen or consulted any other health care providers outside of the [address] [place] [address] since your last visit? Include any pap smears or colon screening. No    Upcoming Appts  none    Health Maintenance reviewed     VORB: No orders of the defined types were placed in this encounter.   Radha Allen LPN

## 2021-03-16 DIAGNOSIS — E55.9 VITAMIN D DEFICIENCY: ICD-10-CM

## 2021-03-16 RX ORDER — MULTIVITAMIN
1 TABLET ORAL 2 TIMES DAILY
Qty: 180 TAB | Refills: 0 | Status: CANCELLED | OUTPATIENT
Start: 2021-03-16

## 2021-03-24 NOTE — TELEPHONE ENCOUNTER
Called the patient at 10:42 AM on 3/24/2021 per LPN's request to schedule a follow up appt before refill the patient's medication. Patient did not answer. A voicemail was left requesting the patient to call and schedule.

## 2021-03-26 ENCOUNTER — VIRTUAL VISIT (OUTPATIENT)
Dept: FAMILY MEDICINE CLINIC | Age: 51
End: 2021-03-26
Payer: COMMERCIAL

## 2021-03-26 VITALS — BODY MASS INDEX: 32.27 KG/M2 | WEIGHT: 189 LBS | HEIGHT: 64 IN

## 2021-03-26 DIAGNOSIS — E66.9 OBESITY, CLASS I, BMI 30-34.9: ICD-10-CM

## 2021-03-26 DIAGNOSIS — I10 HTN (HYPERTENSION), BENIGN: Primary | ICD-10-CM

## 2021-03-26 DIAGNOSIS — Z13.31 SCREENING FOR DEPRESSION: ICD-10-CM

## 2021-03-26 DIAGNOSIS — Z91.89 AT RISK FOR DIABETES MELLITUS: ICD-10-CM

## 2021-03-26 PROCEDURE — 99214 OFFICE O/P EST MOD 30 MIN: CPT | Performed by: INTERNAL MEDICINE

## 2021-03-26 RX ORDER — ALBUTEROL SULFATE 90 UG/1
AEROSOL, METERED RESPIRATORY (INHALATION)
COMMUNITY
Start: 2021-02-11

## 2021-03-26 RX ORDER — SUMATRIPTAN 100 MG/1
TABLET, FILM COATED ORAL
COMMUNITY
Start: 2020-06-01

## 2021-03-26 RX ORDER — CYCLOBENZAPRINE HCL 10 MG
TABLET ORAL
COMMUNITY
Start: 2021-02-15

## 2021-03-26 RX ORDER — MULTIVITAMIN
1 TABLET ORAL 2 TIMES DAILY
Qty: 180 TAB | Refills: 2 | Status: SHIPPED | OUTPATIENT
Start: 2021-03-26

## 2021-03-26 NOTE — PROGRESS NOTES
Pool Valadez is a 48 y.o. female who was seen by synchronous (real-time) audio-video technology on 3/26/2021. Consent:  She and/or her healthcare decision maker is aware that this patient-initiated Telehealth encounter is a billable service, with coverage as determined by her insurance carrier. She is aware that she may receive a bill and has provided verbal consent to proceed: Yes    I was at home while conducting this encounter. Assessment & Plan:   Diagnoses and all orders for this visit:    1. HTN (hypertension), benign  -     CBC WITH AUTOMATED DIFF; Future    2. At risk for diabetes mellitus  -     HEMOGLOBIN A1C WITH EAG; Future    3. Obesity, Class I, BMI 30-34.9  -     LIPID PANEL; Future  -     METABOLIC PANEL, COMPREHENSIVE; Future    4. Screening for depression  -     NC DEPRESSION SCREEN ANNUAL    Other orders  -     calcium-cholecalciferol, D3, (Calcium 600 + D) tablet; Take 1 Tab by mouth two (2) times a day. Follow-up and Dispositions    · Return in about 8 months (around 11/15/2021) for Well woman exam, Go over labs/imaging. Subjective:   Pool Valadez was seen for Medication Refill, Results, Hypertension, Obesity, and Blood sugar problem     Obesity class III/Prediabetes  This is a chronic problem. A1c and weight are not at goal. Pt takes no medication for this. This is diet controlled.     Hypertension   This is a chronic problem. Current status is unknown. Pt takes amlodipine. Pt reports compliance with this medication.       Sarcoidosis  This is a chronic problem. This is at goal. Pt takes no medication for this. This is managed by rheumatology.     3 most recent [place] [id number] Screens 3/26/2021   Little interest or pleasure in doing things Not at all   Feeling down, depressed, irritable, or hopeless Not at all   Total Score PHQ 2 0      Prior to Admission medications    Medication Sig Start Date End Date Taking?  Authorizing Provider   calcium-cholecalciferol, D3, (Calcium 600 + D) tablet Take 1 Tab by mouth two (2) times a day. 3/26/21  Yes Alvaro Guerrero MD   SUMAtriptan (IMITREX) 100 mg tablet sumatriptan 100 mg tablet   Take by oral route as needed. 6/1/20  Yes Provider, Historical   tamoxifen (NOLVADEX) 20 mg tablet  1/3/20  Yes Provider, Historical   amLODIPine (NORVASC) 10 mg tablet Take 1 Tab by mouth daily. 2/2/18  Yes Alvaro Guerrero MD   topiramate (TOPAMAX) 50 mg tablet Take 50 mg by mouth two (2) times a day. 100 mg at hs   Indications: migraine prevention   Yes Provider, Historical   ProAir HFA 90 mcg/actuation inhaler INHALE 1 TO 2 PUFFS BY MOUTH EVERY 4 TO 6 HOURS AS NEEDED FOR WHEEZING/COUGH/SHORTNESS OF BREATH 2/11/21   Provider, Historical   cyclobenzaprine (FLEXERIL) 10 mg tablet TAKE 1 TABLET BY MOUTH THREE TIMES DAILY AS NEEDED 2/15/21   Provider, Historical   calcium-cholecalciferol, D3, (Calcium 600 + D) tablet Take 1 Tab by mouth two (2) times a day. 11/23/20 3/26/21  Alvaro Guerrero MD     Allergies   Allergen Reactions    Pork/Porcine Containing Products Hives     Vital Signs: (As obtained by patient/caregiver at home)  Visit Vitals  Ht 5' 4\" (1.626 m)   Wt 189 lb (85.7 kg)   BMI 32.44 kg/m²      We discussed the expected course, resolution and complications of the diagnosis(es) in detail. Medication risks, benefits, costs, interactions, and alternatives were discussed as indicated. I advised her to contact the office if her condition worsens, changes or fails to improve as anticipated. She expressed understanding with the diagnosis(es) and plan. Pursuant to the emergency declaration under the [facility], [facility]5 waiver authority and the [employer] Act, this Virtual  Visit was conducted, with patient's consent, to reduce the patient's risk of exposure to COVID-19 and provide continuity of care for an established patient.      Services were provided through a video synchronous discussion virtually to substitute for in-person clinic visit.     Katelyn Dailey MD  Internal Medicine  3/26/2021, 12:35 PM  [facility]  [address], [address]  Phone [phone]  Fax [phone]

## 2021-03-26 NOTE — PATIENT INSTRUCTIONS
DASH Diet: Care Instructions Your Care Instructions The DASH diet is an eating plan that can help lower your blood pressure. DASH stands for Dietary Approaches to Stop Hypertension. Hypertension is high blood pressure. The DASH diet focuses on eating foods that are high in calcium, potassium, and magnesium. These nutrients can lower blood pressure. The foods that are highest in these nutrients are fruits, vegetables, low-fat dairy products, nuts, seeds, and legumes. But taking calcium, potassium, and magnesium supplements instead of eating foods that are high in those nutrients does not have the same effect. The DASH diet also includes whole grains, fish, and poultry. The DASH diet is one of several lifestyle changes your doctor may recommend to lower your high blood pressure. Your doctor may also want you to decrease the amount of sodium in your diet. Lowering sodium while following the DASH diet can lower blood pressure even further than just the DASH diet alone. Follow-up care is a key part of your treatment and safety. Be sure to make and go to all appointments, and call your doctor if you are having problems. It's also a good idea to know your test results and keep a list of the medicines you take. How can you care for yourself at home? Following the DASH diet · Eat 4 to 5 servings of fruit each day. A serving is 1 medium-sized piece of fruit, ½ cup chopped or canned fruit, 1/4 cup dried fruit, or 4 ounces (½ cup) of fruit juice. Choose fruit more often than fruit juice. · Eat 4 to 5 servings of vegetables each day. A serving is 1 cup of lettuce or raw leafy vegetables, ½ cup of chopped or cooked vegetables, or 4 ounces (½ cup) of vegetable juice. Choose vegetables more often than vegetable juice. · Get 2 to 3 servings of low-fat and fat-free dairy each day. A serving is 8 ounces of milk, 1 cup of yogurt, or 1 ½ ounces of cheese. · Eat 6 to 8 servings of grains each day.  A serving is 1 slice of bread, 1 ounce of dry cereal, or ½ cup of cooked rice, pasta, or cooked cereal. Try to choose whole-grain products as much as possible. · Limit lean meat, poultry, and fish to 2 servings each day. A serving is 3 ounces, about the size of a deck of cards. · Eat 4 to 5 servings of nuts, seeds, and legumes (cooked dried beans, lentils, and split peas) each week. A serving is 1/3 cup of nuts, 2 tablespoons of seeds, or ½ cup of cooked beans or peas. · Limit fats and oils to 2 to 3 servings each day. A serving is 1 teaspoon of vegetable oil or 2 tablespoons of salad dressing. · Limit sweets and added sugars to 5 servings or less a week. A serving is 1 tablespoon jelly or jam, ½ cup sorbet, or 1 cup of lemonade. · Eat less than 2,300 milligrams (mg) of sodium a day. If you limit your sodium to 1,500 mg a day, you can lower your blood pressure even more. Tips for success · Start small. Do not try to make dramatic changes to your diet all at once. You might feel that you are missing out on your favorite foods and then be more likely to not follow the plan. Make small changes, and stick with them. Once those changes become habit, add a few more changes. · Try some of the following: ? Make it a goal to eat a fruit or vegetable at every meal and at snacks. This will make it easy to get the recommended amount of fruits and vegetables each day. ? Try yogurt topped with fruit and nuts for a snack or healthy dessert. ? Add lettuce, tomato, cucumber, and onion to sandwiches. ? Combine a ready-made pizza crust with low-fat mozzarella cheese and lots of vegetable toppings. Try using tomatoes, squash, spinach, broccoli, carrots, cauliflower, and onions. ? Have a variety of cut-up vegetables with a low-fat dip as an appetizer instead of chips and dip. ? Sprinkle sunflower seeds or chopped almonds over salads. Or try adding chopped walnuts or almonds to cooked vegetables.  
? Try some vegetarian meals using beans and peas. Add garbanzo or kidney beans to salads. Make burritos and tacos with mashed tomlin beans or black beans. Where can you learn more? Go to http://www.gray.com/ Enter F231 in the search box to learn more about \"DASH Diet: Care Instructions. \" Current as of: December 16, 2019               Content Version: 12.6 © 0793-8951 [employer]. Care instructions adapted under license by [employer] (which disclaims liability or warranty for this information). If you have questions about a medical condition or this instruction, always ask your healthcare professional. [address] any warranty or liability for your use of this information.

## 2021-03-26 NOTE — PROGRESS NOTES
Miguelina Iniguez is a 48 y.o.  female presents today for office visit for followup. Pt would also like to discuss medication refill. 1. Have you been to the ER, urgent care clinic since your last visit? Hospitalized since your last visit? No    2. Have you seen or consulted any other health care providers outside of the [address] [place] [address] since your last visit? Include any pap smears or colon screening. No    Upcoming Appts  none    Health Maintenance reviewed    VORB: No orders of the defined types were placed in this encounter.   Geri Solitario, DAVIDN

## 2021-03-29 DIAGNOSIS — I10 HTN (HYPERTENSION), BENIGN: ICD-10-CM

## 2021-03-29 DIAGNOSIS — E66.9 OBESITY, CLASS I, BMI 30-34.9: ICD-10-CM

## 2023-09-20 NOTE — ADDENDUM NOTE
Addended by: Joseph Guzman on: 8/3/2017 10:29 AM     Modules accepted: Tonny
What Type Of Note Output Would You Prefer (Optional)?: Standard Output
How Severe Is Your Skin Lesion?: mild
Is This A New Presentation, Or A Follow-Up?: Skin Lesion